# Patient Record
Sex: FEMALE | Race: WHITE | NOT HISPANIC OR LATINO | Employment: FULL TIME | URBAN - METROPOLITAN AREA
[De-identification: names, ages, dates, MRNs, and addresses within clinical notes are randomized per-mention and may not be internally consistent; named-entity substitution may affect disease eponyms.]

---

## 2017-08-08 ENCOUNTER — TRANSFERRED RECORDS (OUTPATIENT)
Dept: HEALTH INFORMATION MANAGEMENT | Facility: CLINIC | Age: 22
End: 2017-08-08

## 2017-11-03 ENCOUNTER — TELEPHONE (OUTPATIENT)
Dept: PEDIATRICS | Facility: CLINIC | Age: 22
End: 2017-11-03

## 2017-11-03 NOTE — TELEPHONE ENCOUNTER
Mother called asking if writer can assist her in finding out what is recommended for vaccinations for traveling to [facility]. Writer pulled up [facility] website and advised. Mother verbalized understanding and will make an appointment with travels clinic to also double check. Writer also spoke with mother about health safety when traveling, I.E bring copy of immunization records, medication record, international health insurance, and all needed legal forms of ID. Mother verbalized understanding.

## 2018-01-20 ENCOUNTER — HEALTH MAINTENANCE LETTER (OUTPATIENT)
Age: 23
End: 2018-01-20

## 2018-07-27 ENCOUNTER — TRANSFERRED RECORDS (OUTPATIENT)
Dept: HEALTH INFORMATION MANAGEMENT | Facility: CLINIC | Age: 23
End: 2018-07-27

## 2018-07-31 ENCOUNTER — OFFICE VISIT (OUTPATIENT)
Dept: FAMILY MEDICINE | Facility: CLINIC | Age: 23
End: 2018-07-31
Payer: COMMERCIAL

## 2018-07-31 VITALS
SYSTOLIC BLOOD PRESSURE: 112 MMHG | DIASTOLIC BLOOD PRESSURE: 72 MMHG | OXYGEN SATURATION: 98 % | BODY MASS INDEX: 33.82 KG/M2 | HEIGHT: 65 IN | WEIGHT: 203 LBS | HEART RATE: 101 BPM | TEMPERATURE: 98.3 F

## 2018-07-31 DIAGNOSIS — Z30.011 ENCOUNTER FOR INITIAL PRESCRIPTION OF CONTRACEPTIVE PILLS: Primary | ICD-10-CM

## 2018-07-31 DIAGNOSIS — B07.8 COMMON WART: ICD-10-CM

## 2018-07-31 LAB — BETA HCG QUAL IFA URINE: NEGATIVE

## 2018-07-31 PROCEDURE — 99213 OFFICE O/P EST LOW 20 MIN: CPT | Mod: 25 | Performed by: PHYSICIAN ASSISTANT

## 2018-07-31 PROCEDURE — 17110 DESTRUCTION B9 LES UP TO 14: CPT | Performed by: PHYSICIAN ASSISTANT

## 2018-07-31 PROCEDURE — 84703 CHORIONIC GONADOTROPIN ASSAY: CPT | Performed by: PHYSICIAN ASSISTANT

## 2018-07-31 RX ORDER — NORGESTIMATE AND ETHINYL ESTRADIOL 0.25-0.035
1 KIT ORAL DAILY
Qty: 28 TABLET | Refills: 1 | Status: SHIPPED | OUTPATIENT
Start: 2018-07-31 | End: 2018-09-20

## 2018-07-31 NOTE — PROGRESS NOTES
"  SUBJECTIVE:                                                    Monique Alicea is a 22 year old female who presents to clinic today for the following health issues:      WART(S)  Onset: x2 weeks - went away after having for 2 months    Description:   Location: R middle finger  Number of warts: 1  Painful: no    Accompanying Signs & Symptoms:  Signs of infection: no    History:   History of trauma: no  Prior warts: YES- middle school age    Therapies Tried and outcome: none    Patient reports that she has not been using any over the counter treatments for the wart.  She would like it frozen off today.      Patient is interested in starting the birth control pill.  She is not currently sexually active.  She reports that she is wanting it for the benefits of helping regulate her cycle.  She states that she uses tampons and that she goes through about 4-5 in the first 1-2 days of the cycle.  The cycles last about 6-7 days and they do occur monthly.      She denies migraine headaches with auras.  She also denies any smoking.      Problem list and histories reviewed & adjusted, as indicated.  Additional history: as documented      ROS:  Constitutional, HEENT, cardiovascular, pulmonary, GI, , musculoskeletal, neuro, skin, endocrine and psych systems are negative, except as otherwise noted.    OBJECTIVE:                                                    /72 (BP Location: Right arm, Patient Position: Chair, Cuff Size: Adult Large)  Pulse 101  Temp 98.3  F (36.8  C) (Oral)  Ht 5' 5.4\" (1.661 m)  Wt 203 lb (92.1 kg)  LMP 07/20/2018 (Exact Date)  SpO2 98%  Breastfeeding? No  BMI 33.37 kg/m2  Body mass index is 33.37 kg/(m^2).  GENERAL: healthy, alert and no distress  EYES: Eyes grossly normal to inspection, PERRL and conjunctivae and sclerae normal  RESP: lungs clear to auscultation - no rales, rhonchi or wheezes  BREAST: normal without masses, tenderness or nipple discharge and no palpable axillary masses or " adenopathy  CV: regular rate and rhythm, normal S1 S2, no S3 or S4, no murmur, click or rub, no peripheral edema and peripheral pulses strong  MS: no gross musculoskeletal defects noted, no edema  NEURO: Normal strength and tone, mentation intact and speech normal  PSYCH: mentation appears normal, affect normal/bright    Diagnostic Test Results:  Results for orders placed or performed in visit on 07/31/18 (from the past 24 hour(s))   Beta HCG Qual, Urine - [facility] [place] ([id number])   Result Value Ref Range    Beta HCG Qual IFA Urine Negative NEG^Negative           ASSESSMENT/PLAN:                                                      Monique was seen today for new patient and wart.    Diagnoses and all orders for this visit:    Encounter for initial prescription of contraceptive pills  -     Beta HCG Qual, Urine - [facility] [place] ([id number])  -     norgestimate-ethinyl estradiol (ORTHO-CYCLEN, SPRINTEC) 0.25-35 MG-MCG per tablet; Take 1 tablet by mouth daily    Common wart  -     DESTRUCT BENIGN LESION, UP TO 14      - Common wart on right middle finger treated today with liquid nitrogen.  Freeze/thaw cycles x3 done today.  Wart wrapped with a band-aid.    - Patient tolerated the procedure well.     - Patient will start the birth control pills.    - Patient has been advised to use back up birth control for contraception for the first 2 weeks.   - Patient was advised that she should always use condoms to prevent risk of STD's.   - Close followup advised since starting the oral contraceptive.  She has been advised to followup in about 4 weeks, or sooner if needed.    - Patient should be seen sooner if needed.     -- I have discussed the patient's diagnosis, and my plan of treatment with the patient and/or family. Patient is aware to followup if symptoms do not improve.  Patient has been advised to be seen sooner or seek more immediate care if symptoms change or worsen.  Patient agrees with and understands the plan  today.     See Patient Instructions        Irene Santoyo PA-C    [facility] [place]

## 2018-07-31 NOTE — PATIENT INSTRUCTIONS
Please followup for a medication check in about 4-5 weeks, or sooner if needed.       Birth Control: The Pill    Birth control pills contain hormones that help prevent pregnancy. The pills are prescribed by your healthcare provider. There are many types of birth control pills available. If you have side effects from one type of pill, tell your healthcare provider. He or she may be able to prescribe a pill that works better for you.  Pregnancy rates  Talk to your healthcare provider about the effectiveness of this birth control method.  Using the pill    Take one pill daily. Take it at around the same time each day.    Follow your healthcare provider s guidelines on when to start your first pack of pills. You may need to use another form of birth control for a week or more after you start.    Know what to do if you forget to take a pill. (Consult your healthcare provider or check the package.) If you miss more than one pill, you may need to use a backup method of birth control for a week or more.  Pros    Low pregnancy rate    No interruption to sex    Easy to use    Can help make periods more regular    May lower your risk of ovarian cysts and certain cancers    May decrease menstrual cramps, menstrual flow, and acne  Cons    Does not protect against sexually transmitted infection (STIs)    Requires taking a pill on time each day    May not work as well when taken with certain other medicines (check with your pharmacist)    May cause side effects such as nausea, irregular bleeding, headaches, breast tenderness, fatigue, or mood changes (these often go away within 3 months)    May increase the risk of blood clots, heart attack, and stroke  The pill may not be for you  The pill may not be for you if:    You are a smoker and over age 35    You have high blood pressure or gallbladder, liver, cerebrovascular  or heart disease    You have diabetes, migraines, blood clot in the vein or artery, lupus, depression, certain  lipid disorders, or take medicines that interfere with the pill  In these cases, discuss the risks with your healthcare provider.  Date Last Reviewed: 3/1/2017    6740-7361 [employer]. [address] [place] [address], [place] [address]. All rights reserved. This information is not intended as a substitute for professional medical care. Always follow your healthcare professional's instructions.

## 2018-07-31 NOTE — MR AVS SNAPSHOT
After Visit Summary   7/31/2018    Monique Alicea    MRN: [id number]           Patient Information     Date Of Birth          1995        Visit Information        Provider Department      7/31/2018 3:00 PM Irene Santoyo PA-C [facility] [place]        Today's Diagnoses     Encounter for initial prescription of contraceptive pills    -  1      Care Instructions    Please followup for a medication check in about 4-5 weeks, or sooner if needed.       Birth Control: The Pill    Birth control pills contain hormones that help prevent pregnancy. The pills are prescribed by your healthcare provider. There are many types of birth control pills available. If you have side effects from one type of pill, tell your healthcare provider. He or she may be able to prescribe a pill that works better for you.  Pregnancy rates  Talk to your healthcare provider about the effectiveness of this birth control method.  Using the pill    Take one pill daily. Take it at around the same time each day.    Follow your healthcare provider s guidelines on when to start your first pack of pills. You may need to use another form of birth control for a week or more after you start.    Know what to do if you forget to take a pill. (Consult your healthcare provider or check the package.) If you miss more than one pill, you may need to use a backup method of birth control for a week or more.  Pros    Low pregnancy rate    No interruption to sex    Easy to use    Can help make periods more regular    May lower your risk of ovarian cysts and certain cancers    May decrease menstrual cramps, menstrual flow, and acne  Cons    Does not protect against sexually transmitted infection (STIs)    Requires taking a pill on time each day    May not work as well when taken with certain other medicines (check with your pharmacist)    May cause side effects such as nausea, irregular bleeding, headaches, breast tenderness, fatigue, or  mood changes (these often go away within 3 months)    May increase the risk of blood clots, heart attack, and stroke  The pill may not be for you  The pill may not be for you if:    You are a smoker and over age 35    You have high blood pressure or gallbladder, liver, cerebrovascular  or heart disease    You have diabetes, migraines, blood clot in the vein or artery, lupus, depression, certain lipid disorders, or take medicines that interfere with the pill  In these cases, discuss the risks with your healthcare provider.  Date Last Reviewed: 3/1/2017    6482-4466 [employer]. [address] [place] [address]. All rights reserved. This information is not intended as a substitute for professional medical care. Always follow your healthcare professional's instructions.                Follow-ups after your visit        Follow-up notes from your care team     Return in about 4 weeks (around 8/28/2018) for medication re-check, please be seen sooner if needed.      Who to contact     If you have questions or need follow up information about today's clinic visit or your schedule please contact [facility] directly at [phone].  Normal or non-critical lab and imaging results will be communicated to you by [employer], letter or phone within 4 business days after the clinic has received the results. If you do not hear from us within 7 days, please contact the clinic through [employer] or phone. If you have a critical or abnormal lab result, we will notify you by phone as soon as possible.  Submit refill requests through [employer] or call your pharmacy and they will forward the refill request to us. Please allow 3 business days for your refill to be completed.          Additional Information About Your Visit        [employer] Information     [employer] gives you secure access to your electronic health record. If you see a primary care provider, you can also send messages to your care team and make  "appointments. If you have questions, please call your primary care clinic.  If you do not have a primary care provider, please call [phone] and they will assist you.        Care EveryWhere ID     This is your Care EveryWhere ID. This could be used by other organizations to access your [place] medical records  [id number]        Your Vitals Were     Pulse Temperature Height Last Period Pulse Oximetry Breastfeeding?    101 98.3  F (36.8  C) (Oral) 5' 5.4\" (1.661 m) 07/20/2018 (Exact Date) 98% No    BMI (Body Mass Index)                   33.37 kg/m2            Blood Pressure from Last 3 Encounters:   07/31/18 112/72   10/02/15 107/75   03/18/15 110/74    Weight from Last 3 Encounters:   07/31/18 203 lb (92.1 kg)   10/02/15 217 lb 9.6 oz (98.7 kg) (99 %)*   03/18/15 224 lb 4.8 oz (101.7 kg) (99 %)*     * Growth percentiles are based on CDC 2-20 Years data.              We Performed the Following     Beta HCG Qual, Urine - [facility] ([id number])          Today's Medication Changes          These changes are accurate as of 7/31/18  4:04 PM.  If you have any questions, ask your nurse or doctor.               Start taking these medicines.        Dose/Directions    norgestimate-ethinyl estradiol 0.25-35 MG-MCG per tablet   Commonly known as:  ORTHO-CYCLEN, SPRINTEC   Used for:  Encounter for initial prescription of contraceptive pills   Started by:  Irene Santoyo PA-C        Dose:  1 tablet   Take 1 tablet by mouth daily   Quantity:  28 tablet   Refills:  1            Where to get your medicines      These medications were sent to [place] [address] [place] [facility] [place] - [address]  [address] [place] [address]     Phone:  [phone]     norgestimate-ethinyl estradiol 0.25-35 MG-MCG per tablet                Primary Care Provider Office Phone # Fax #    Ingrid Ken [phone]       [address] [address]        Equal Access to Services  "    BONNIE PINTO : Hadii aad ku selene Perrin, waaxda luqadaha, qaybta kaalmada sonyamichellefaviola, amari jose lathamsaranmargo may. So [facility] [phone].    ATENCIÓN: Si habla español, tiene a jj disposición servicios gratuitos de asistencia lingüística. Llame al [phone].    We comply with applicable federal civil rights laws and [place] laws. We do not discriminate on the basis of race, color, national origin, age, disability, sex, sexual orientation, or gender identity.            Thank you!     Thank you for choosing [facility]  for your care. Our goal is always to provide you with excellent care. Hearing back from our patients is one way we can continue to improve our services. Please take a few minutes to complete the written survey that you may receive in the mail after your visit with us. Thank you!             Your Updated Medication List - Protect others around you: Learn how to safely use, store and throw away your medicines at www.disposemymeds.org.          This list is accurate as of 7/31/18  4:04 PM.  Always use your most recent med list.                   Brand Name Dispense Instructions for use Diagnosis    norgestimate-ethinyl estradiol 0.25-35 MG-MCG per tablet    ORTHO-CYCLEN, SPRINTEC    28 tablet    Take 1 tablet by mouth daily    Encounter for initial prescription of contraceptive pills

## 2018-09-20 DIAGNOSIS — Z30.011 ENCOUNTER FOR INITIAL PRESCRIPTION OF CONTRACEPTIVE PILLS: ICD-10-CM

## 2018-09-21 RX ORDER — NORGESTIMATE AND ETHINYL ESTRADIOL 0.25-0.035
1 KIT ORAL DAILY
Qty: 28 TABLET | Refills: 0 | Status: SHIPPED | OUTPATIENT
Start: 2018-09-21 | End: 2018-11-08

## 2018-09-21 NOTE — TELEPHONE ENCOUNTER
Medication is being filled for 1 time refill only due to:  Patient needs to be seen because due for OV per last visit notes.   Sintia Leslie RN  [facility]

## 2018-09-21 NOTE — TELEPHONE ENCOUNTER
"Requested Prescriptions   Pending Prescriptions Disp Refills     norgestimate-ethinyl estradiol (ORTHO-CYCLEN, SPRINTEC) 0.25-35 MG-MCG per tablet 28 tablet 1        Last Written Prescription Date:  7.31.18  Last Fill Quantity: 28,  # refills: 1   Last Office Visit: 7/31/2018   Future Office Visit:      Sig: Take 1 tablet by mouth daily    Contraceptives Protocol Passed    9/20/2018  9:06 AM       Passed - Patient is not a current smoker if age is 35 or older       Passed - Recent (12 mo) or future (30 days) visit within the authorizing provider's specialty    Patient had office visit in the last 12 months or has a visit in the next 30 days with authorizing provider or within the authorizing provider's specialty.  See \"Patient Info\" tab in inbasket, or \"Choose Columns\" in Meds & Orders section of the refill encounter.           Passed - No active pregnancy on record       Passed - No positive pregnancy test in past 12 months          "

## 2018-11-08 DIAGNOSIS — Z30.011 ENCOUNTER FOR INITIAL PRESCRIPTION OF CONTRACEPTIVE PILLS: ICD-10-CM

## 2018-11-08 RX ORDER — NORGESTIMATE AND ETHINYL ESTRADIOL 0.25-0.035
1 KIT ORAL DAILY
Qty: 28 TABLET | Refills: 0 | Status: SHIPPED | OUTPATIENT
Start: 2018-11-08 | End: 2018-11-19

## 2018-11-08 NOTE — TELEPHONE ENCOUNTER
Name of caller: Monique  Relationship of Patient: Self    Reason for Call: PT mother called to request a refill of her daughters norgestimate-ethinyl estradiol (ORTHO-CYCLEN, SPRINTEC) 0.25-35 MG-MCG per tablet. She is scheduled for an appt with [facility] on 11/19. She is away at college and that will be the first day she is back. They are wondering if we could refill to get her to the appt since she will be out as of this Sunday.     norgestimate-ethinyl estradiol (ORTHO-CYCLEN, SPRINTEC) 0.25-35 MG-MCG per tablet 28 tablet 0 9/21/2018  No   Sig - Route: Take 1 tablet by mouth daily - Oral   Class: E-Prescribe   Notes to Pharmacy: Pt due for OV per last OV notes. Please advise. Thank you.   Order: [id number]   E-Prescribing Status: Receipt confirmed by pharmacy (9/21/2018  3:19 PM CDT)         Best phone number to reach pt at is: [phone]  Ok to leave a message with medical info? Yes    Pharmacy preferred (if calling for a refill): [facility]  Patient Representative - [facility]

## 2018-11-08 NOTE — TELEPHONE ENCOUNTER
"Requested Prescriptions   Pending Prescriptions Disp Refills     norgestimate-ethinyl estradiol (ORTHO-CYCLEN, SPRINTEC) 0.25-35 MG-MCG per tablet 28 tablet 0      Last Written Prescription Date:  9.21.18  Last Fill Quantity: 28,  # refills: 0   Last Office Visit: 7/31/2018   Future Office Visit:    Next 5 appointments (look out 90 days)     Nov 19, 2018  4:00 PM CST   [facility] Visit with Irene Santoyo PA-C   [facility]    [address] [place] [id number] [phone]                  Sig: Take 1 tablet by mouth daily    Contraceptives Protocol Passed    11/8/2018  3:02 PM       Passed - Patient is not a current smoker if age is 35 or older       Passed - Recent (12 mo) or future (30 days) visit within the authorizing provider's specialty    Patient had office visit in the last 12 months or has a visit in the next 30 days with authorizing provider or within the authorizing provider's specialty.  See \"Patient Info\" tab in inbasket, or \"Choose Columns\" in Meds & Orders section of the refill encounter.             Passed - No active pregnancy on record       Passed - No positive pregnancy test in past 12 months          "

## 2018-11-08 NOTE — TELEPHONE ENCOUNTER
Filled per [facility] protocol.  Patient has appointment scheduled for 11/19/2018    REJI HouseN, RN  [employer] [facility]

## 2018-11-19 ENCOUNTER — OFFICE VISIT (OUTPATIENT)
Dept: FAMILY MEDICINE | Facility: CLINIC | Age: 23
End: 2018-11-19
Payer: COMMERCIAL

## 2018-11-19 VITALS
HEART RATE: 71 BPM | HEIGHT: 65 IN | TEMPERATURE: 98.2 F | OXYGEN SATURATION: 99 % | WEIGHT: 218 LBS | SYSTOLIC BLOOD PRESSURE: 116 MMHG | BODY MASS INDEX: 36.32 KG/M2 | DIASTOLIC BLOOD PRESSURE: 83 MMHG

## 2018-11-19 DIAGNOSIS — Z30.41 ENCOUNTER FOR SURVEILLANCE OF CONTRACEPTIVE PILLS: ICD-10-CM

## 2018-11-19 PROCEDURE — 99213 OFFICE O/P EST LOW 20 MIN: CPT | Performed by: PHYSICIAN ASSISTANT

## 2018-11-19 RX ORDER — NORGESTIMATE AND ETHINYL ESTRADIOL 0.25-0.035
1 KIT ORAL DAILY
Qty: 84 TABLET | Refills: 3 | Status: SHIPPED | OUTPATIENT
Start: 2018-11-19 | End: 2019-09-30

## 2018-11-19 NOTE — MR AVS SNAPSHOT
After Visit Summary   11/19/2018    Monique Alicea    MRN: [id number]           Patient Information     Date Of Birth          1995        Visit Information        Provider Department      11/19/2018 4:00 PM Irene Santoyo PA-C [facility]     Encounter for initial prescription of contraceptive pills           Follow-ups after your visit        Follow-up notes from your care team     Return in about 1 year (around 11/19/2019) for medication re-check, please be seen sooner if needed.      Who to contact     If you have questions or need follow up information about today's clinic visit or your schedule please contact [facility] directly at [phone].  Normal or non-critical lab and imaging results will be communicated to you by MyChart, letter or phone within 4 business days after the clinic has received the results. If you do not hear from us within 7 days, please contact the clinic through [email or web address] or phone. If you have a critical or abnormal lab result, we will notify you by phone as soon as possible.  Submit refill requests through [employer] or call your pharmacy and they will forward the refill request to us. Please allow 3 business days for your refill to be completed.          Additional Information About Your Visit        MyChart Information     [employer] gives you secure access to your electronic health record. If you see a primary care provider, you can also send messages to your care team and make appointments. If you have questions, please call your primary care clinic.  If you do not have a primary care provider, please call [phone] and they will assist you.        Care EveryWhere ID     This is your Care EveryWhere ID. This could be used by other organizations to access your [place] medical records  [id number]        Your Vitals Were     Pulse Temperature Height Last Period Pulse Oximetry Breastfeeding?    71 98.2  F  "(36.8  C) (Oral) 5' 5.4\" (1.661 m) 10/31/2018 (Exact Date) 99% No    BMI (Body Mass Index)                   35.83 kg/m2            Blood Pressure from Last 3 Encounters:   11/19/18 116/83   07/31/18 112/72   10/02/15 107/75    Weight from Last 3 Encounters:   11/19/18 218 lb (98.9 kg)   07/31/18 203 lb (92.1 kg)   10/02/15 217 lb 9.6 oz (98.7 kg) (99 %)*     * Growth percentiles are based on [facility] 2-20 Years data.              Today, you had the following     No orders found for display         Where to get your medicines      These medications were sent to [place] [address] [place] [address] [facility]  [address] [facility] [address]     Phone:  [phone]     norgestimate-ethinyl estradiol 0.25-35 MG-MCG per tablet          Primary Care Provider Office Phone # Fax #    Ingrid Ken [phone]       [address] [facility] [address]  [facility] [address]        Equal Access to Services     QUETA PINTO AH: Hadii kip spakrso Soterell, waaxda luqadaha, qaybta kaalmada adeegyada, amari may. So [facility] [phone].    ATENCIÓN: Si habla español, tiene a jj disposición servicios gratuitos de asistencia lingüística. Michi al [phone].    We comply with applicable federal civil rights laws and [place] laws. We do not discriminate on the basis of race, color, national origin, age, disability, sex, sexual orientation, or gender identity.            Thank you!     Thank you for choosing [facility]  for your care. Our goal is always to provide you with excellent care. Hearing back from our patients is one way we can continue to improve our services. Please take a few minutes to complete the written survey that you may receive in the mail after your visit with us. Thank you!             Your Updated Medication List - Protect others around you: Learn how to safely use, store and throw away your medicines at www.disposemymeds.org.        "   This list is accurate as of 11/19/18  4:36 PM.  Always use your most recent med list.                   Brand Name Dispense Instructions for use Diagnosis    norgestimate-ethinyl estradiol 0.25-35 MG-MCG per tablet    ORTHO-CYCLEN, SPRINTEC    84 tablet    Take 1 tablet by mouth daily    Encounter for initial prescription of contraceptive pills

## 2018-11-19 NOTE — PROGRESS NOTES
"  SUBJECTIVE:                                                    Monique Alicea is a 23 year old female who presents to clinic today for the following health issues:      Medication Followup of Birth Control    Taking Medication as prescribed: yes    Side Effects:  None    Medication Helping Symptoms:  yes      Patient reports to doing well on the birth control.  She does not have concerns today.  She thinks her cycle is better.  Patient is not sexually active.     Patient denies headaches with auras or any other potential side effects.         Problem list and histories reviewed & adjusted, as indicated.  Additional history: as documented      ROS:  Constitutional, HEENT, cardiovascular, pulmonary, GI, , musculoskeletal, neuro, skin, endocrine and psych systems are negative, except as otherwise noted.    OBJECTIVE:                                                    /83 (BP Location: Right arm, Patient Position: Chair, Cuff Size: Adult Large)  Pulse 71  Temp 98.2  F (36.8  C) (Oral)  Ht 5' 5.4\" (1.661 m)  Wt 218 lb (98.9 kg)  LMP 10/31/2018 (Exact Date)  SpO2 99%  Breastfeeding? No  BMI 35.83 kg/m2  Body mass index is 35.83 kg/(m^2).  GENERAL: healthy, alert and no distress  EYES: Eyes grossly normal to inspection, PERRL and conjunctivae and sclerae normal  RESP: lungs clear to auscultation - no rales, rhonchi or wheezes  CV: regular rate and rhythm, normal S1 S2, no S3 or S4, no murmur, click or rub, no peripheral edema and peripheral pulses strong  MS: no gross musculoskeletal defects noted, no edema  NEURO: Normal strength and tone, mentation intact and speech normal  PSYCH: mentation appears normal, affect normal/bright    Diagnostic Test Results:  none      ASSESSMENT/PLAN:                                                      Monique was seen today for recheck medication.    Diagnoses and all orders for this visit:    Encounter for surveillance of contraceptive pills  -     norgestimate-ethinyl " estradiol (ORTHO-CYCLEN, SPRINTEC) 0.25-35 MG-MCG per tablet; Take 1 tablet by mouth daily      Patient is doing well on the oral contraceptive.  She has been advised that she can continue taking this medication daily.  Patient has been reminded that she needs to followup annually for breast exams and renewal of the birth control.  Patient should be seen sooner if needed.     Followup: Return in about 1 year (around 11/19/2019) for medication re-check, please be seen sooner if needed.    -- I have discussed the patient's diagnosis, and my plan of treatment with the patient and/or family. Patient is aware to followup if symptoms do not improve.  Patient has been advised to be seen sooner or seek more immediate care if symptoms change or worsen.  Patient agrees with and understands the plan today.     See Patient Instructions        Irene Santoyo PA-C    [facility] [place]

## 2018-12-26 DIAGNOSIS — Z30.41 ENCOUNTER FOR SURVEILLANCE OF CONTRACEPTIVE PILLS: ICD-10-CM

## 2018-12-26 RX ORDER — NORGESTIMATE AND ETHINYL ESTRADIOL 0.25-0.035
1 KIT ORAL DAILY
Qty: 84 TABLET | Refills: 3 | Status: CANCELLED | OUTPATIENT
Start: 2018-12-26

## 2018-12-26 NOTE — TELEPHONE ENCOUNTER
"Requested Prescriptions   Pending Prescriptions Disp Refills     norgestimate-ethinyl estradiol (ORTHO-CYCLEN/SPRINTEC) 0.25-35 MG-MCG tablet 84 tablet 3      Last Written Prescription Date:  11.19.18  Last Fill Quantity: 84,  # refills: 3   Last Office Visit: 11/19/2018   Future Office Visit:      Sig: Take 1 tablet by mouth daily    Contraceptives Protocol Passed - 12/26/2018 12:38 PM       Passed - Patient is not a current smoker if age is 35 or older       Passed - Recent (12 mo) or future (30 days) visit within the authorizing provider's specialty    Patient had office visit in the last 12 months or has a visit in the next 30 days with authorizing provider or within the authorizing provider's specialty.  See \"Patient Info\" tab in inbasket, or \"Choose Columns\" in Meds & Orders section of the refill encounter.             Passed - No active pregnancy on record       Passed - No positive pregnancy test in past 12 months        "

## 2018-12-27 RX ORDER — ETHYNODIOL DIACETATE AND ETHINYL ESTRADIOL 1 MG-35MCG
1 KIT ORAL DAILY
Qty: 84 TABLET | Refills: 1 | Status: SHIPPED | OUTPATIENT
Start: 2018-12-27 | End: 2019-06-09

## 2018-12-27 NOTE — TELEPHONE ENCOUNTER
CTC on file for mom.     BC is causing more breakouts/acne. This is worse than before, cysts.    Huddled with JV. Advised for pt to try Demulen OCP.  The patient indicates understanding of these issues and agrees with the plan.    Sintia Leslie RN  [facility]

## 2019-06-09 DIAGNOSIS — Z30.41 ENCOUNTER FOR SURVEILLANCE OF CONTRACEPTIVE PILLS: ICD-10-CM

## 2019-06-09 RX ORDER — ETHYNODIOL DIACETATE AND ETHINYL ESTRADIOL 1 MG-35MCG
KIT ORAL
Qty: 84 TABLET | Refills: 1 | Status: SHIPPED | OUTPATIENT
Start: 2019-06-09 | End: 2019-12-09

## 2019-09-30 ENCOUNTER — OFFICE VISIT (OUTPATIENT)
Dept: INTERNAL MEDICINE | Facility: CLINIC | Age: 24
End: 2019-09-30
Payer: COMMERCIAL

## 2019-09-30 VITALS
RESPIRATION RATE: 16 BRPM | BODY MASS INDEX: 34.39 KG/M2 | OXYGEN SATURATION: 96 % | TEMPERATURE: 98.3 F | HEIGHT: 66 IN | WEIGHT: 214 LBS | SYSTOLIC BLOOD PRESSURE: 116 MMHG | HEART RATE: 94 BPM | DIASTOLIC BLOOD PRESSURE: 70 MMHG

## 2019-09-30 DIAGNOSIS — Z23 NEED FOR VACCINATION: ICD-10-CM

## 2019-09-30 DIAGNOSIS — F41.9 ANXIETY: Primary | ICD-10-CM

## 2019-09-30 DIAGNOSIS — F32.0 MILD MAJOR DEPRESSION (H): ICD-10-CM

## 2019-09-30 PROCEDURE — 99214 OFFICE O/P EST MOD 30 MIN: CPT | Mod: 25 | Performed by: INTERNAL MEDICINE

## 2019-09-30 PROCEDURE — 90471 IMMUNIZATION ADMIN: CPT | Performed by: INTERNAL MEDICINE

## 2019-09-30 PROCEDURE — 90715 TDAP VACCINE 7 YRS/> IM: CPT | Performed by: INTERNAL MEDICINE

## 2019-09-30 ASSESSMENT — PATIENT HEALTH QUESTIONNAIRE - PHQ9
SUM OF ALL RESPONSES TO PHQ QUESTIONS 1-9: 15
5. POOR APPETITE OR OVEREATING: NEARLY EVERY DAY

## 2019-09-30 ASSESSMENT — MIFFLIN-ST. JEOR: SCORE: 1743.48

## 2019-09-30 ASSESSMENT — ANXIETY QUESTIONNAIRES
7. FEELING AFRAID AS IF SOMETHING AWFUL MIGHT HAPPEN: MORE THAN HALF THE DAYS
2. NOT BEING ABLE TO STOP OR CONTROL WORRYING: MORE THAN HALF THE DAYS
5. BEING SO RESTLESS THAT IT IS HARD TO SIT STILL: NEARLY EVERY DAY
GAD7 TOTAL SCORE: 17
6. BECOMING EASILY ANNOYED OR IRRITABLE: MORE THAN HALF THE DAYS
IF YOU CHECKED OFF ANY PROBLEMS ON THIS QUESTIONNAIRE, HOW DIFFICULT HAVE THESE PROBLEMS MADE IT FOR YOU TO DO YOUR WORK, TAKE CARE OF THINGS AT HOME, OR GET ALONG WITH OTHER PEOPLE: VERY DIFFICULT
1. FEELING NERVOUS, ANXIOUS, OR ON EDGE: MORE THAN HALF THE DAYS
3. WORRYING TOO MUCH ABOUT DIFFERENT THINGS: NEARLY EVERY DAY

## 2019-09-30 NOTE — PROGRESS NOTES
Subjective     Monique Alicea is a 23 year old female who presents to clinic today for the following health issues:    HPI       Depression and Anxiety Follow-Up    How are you doing with your depression since your last visit? Worsened since 2-3 wks. , patient has been feeling more anxious, worries about a lot of things, restless, easily annoyed, sleep is disturbed, appetite varies, trouble concentrating on things,  feeling bad about self.  No suicidal ideation or thoughts.  Currently not on any medications for the symptoms.  Patient states that she has tried a few medications when she was in high school .    How are you doing with your anxiety since your last visit?  Worsened  , patient moved back home from college and started graduate schooling and also working,      Are you having other symptoms that might be associated with depression or anxiety? No    Have you had a significant life event? OTHER: moved back home from college and started graduate school     Do you have any concerns with your use of alcohol or other drugs? No    PHQ 10/2/2015 9/30/2019   PHQ-9 Total Score 2 15   Q9: Thoughts of better off dead/self-harm past 2 weeks Not at all Not at all     JORGE-7 SCORE 12/3/2014 1/26/2015 9/30/2019   Total Score 13 3 -   Total Score - - 17          Patient Active Problem List   Diagnosis     Mild major depression (H)     Anxiety     Vitamin D deficiency     Obesity, Class II, BMI 35-39.9     History reviewed. No pertinent surgical history.    Social History     Tobacco Use     Smoking status: Never Smoker     Smokeless tobacco: Never Used   Substance Use Topics     Alcohol use: Yes     Comment: 0-1 drinks per week     Family History   Problem Relation Age of Onset     Diabetes Other      Thyroid Disease Maternal Grandmother      Family History Negative Mother      Family History Negative Father          Current Outpatient Medications   Medication Sig Dispense Refill     ANITHA 1/35 1-35 MG-MCG tablet TAKE 1 TABLET  "BY MOUTH EVERY DAY 84 tablet 1     sertraline (ZOLOFT) 50 MG tablet Take 1 tablet (50 mg) by mouth daily 30 tablet 0       Reviewed and updated as needed this visit by Provider         Review of Systems   ROS COMP: CONSTITUTIONAL: NEGATIVE for fever, chills, change in weight  EYES: NEGATIVE for vision changes or irritation  RESP: NEGATIVE for significant cough or SOB  CV: NEGATIVE for chest pain, palpitations or peripheral edema  NEURO: NEGATIVE for weakness, dizziness or paresthesias  PSYCHIATRIC: anxiety and depressed mood      Objective    /70   Pulse 94   Temp 98.3  F (36.8  C) (Oral)   Resp 16   Ht 1.67 m (5' 5.75\")   Wt 97.1 kg (214 lb)   LMP 09/16/2019   SpO2 96%   BMI 34.80 kg/m    Body mass index is 34.8 kg/m .  Physical Exam   GENERAL: healthy, alert and no distress  EYES: Eyes grossly normal to inspection, PERRL and conjunctivae and sclerae normal  NECK: no adenopathy, no asymmetry, masses, or scars and thyroid normal to palpation  RESP: lungs clear to auscultation - no rales, rhonchi or wheezes  CV: regular rate and rhythm, normal S1 S2, no S3 or S4, no murmur, click or rub, no peripheral edema and peripheral pulses strong  MS: no gross musculoskeletal defects noted, no edema  NEURO: Normal strength and tone, mentation intact and speech normal  PSYCH: mentation appears normal, affect normal/bright         Assessment & Plan     (F41.9) Anxiety     (F32.0) Mild major depression (H)  Plan: started on sertraline (ZOLOFT) 50 MG tablet as directed.explained clearly about the medication,insructions and side effects.  advised to f/u in 3-4 wks.             (Z23) Need for vaccination  Plan: TDAP VACCINE (ADACEL) [15790.002], 1st          Administration  [21997]               BMI:   Estimated body mass index is 34.8 kg/m  as calculated from the following:    Height as of this encounter: 1.67 m (5' 5.75\").    Weight as of this encounter: 97.1 kg (214 lb).   Weight management plan: Discussed healthy " diet and exercise guidelines        FUTURE APPOINTMENTS:       - Follow-up visit in 3-4 wks.        Giuseppe Greco MD  [facility]

## 2019-10-01 ASSESSMENT — ANXIETY QUESTIONNAIRES: GAD7 TOTAL SCORE: 17

## 2019-10-22 DIAGNOSIS — F41.9 ANXIETY: ICD-10-CM

## 2019-10-22 DIAGNOSIS — F32.0 MILD MAJOR DEPRESSION (H): ICD-10-CM

## 2019-10-22 NOTE — LETTER
October 23, 2019      Irene Alicea  [address] [place] [address]              Dear Irene,    We care about your health and have reviewed your health plan including your medical conditions, medications, and lab results.  Based on this review, it is recommended that you follow up regarding the following health topic(s):  -Depression  -Anxiety    We recommend you take the following action(s):  -schedule a FOLLOWUP APPOINTMENT.     Please call us at [phone] (or use [employer] to address the above recommendations.     Thank you for trusting [facility] and we appreciate the opportunity to serve you.  We look forward to supporting your healthcare needs in the future.        Sincerely,    Giuseppe Greco MD/ lg

## 2019-10-22 NOTE — TELEPHONE ENCOUNTER
"Requested Prescriptions   Pending Prescriptions Disp Refills     sertraline (ZOLOFT) 50 MG tablet [Pharmacy Med Name: SERTRALINE HCL 50 MG TABLET]  Last Written Prescription Date:  9/30/2019  Last Fill Quantity: 30 tab,  # refills: 0   Last Office Visit: 9/30/2019 Mayir  Future Office Visit:      30 tablet 0     Sig: TAKE 1 TABLET BY MOUTH EVERY DAY       SSRIs Protocol Failed - 10/22/2019  8:35 AM        Failed - PHQ-9 score less than 5 in past 6 months     Please review last PHQ-9 score.      PHQ-9 SCORE 1/26/2015 10/2/2015 9/30/2019   PHQ-9 Total Score 4 - -   PHQ-9 Total Score - 2 15     JORGE-7 SCORE 12/3/2014 1/26/2015 9/30/2019   Total Score 13 3 -   Total Score - - 17          Passed - Medication is active on med list        Passed - Patient is age 18 or older        Passed - No active pregnancy on record        Passed - No positive pregnancy test in last 12 months        Passed - Recent (6 mo) or future (30 days) visit within the authorizing provider's specialty     Patient had office visit in the last 6 months or has a visit in the next 30 days with authorizing provider or within the authorizing provider's specialty.  See \"Patient Info\" tab in inbasket, or \"Choose Columns\" in Meds & Orders section of the refill encounter.            "

## 2019-10-23 NOTE — TELEPHONE ENCOUNTER
Medication is being filled for 1 time refill only due to:  Patient needs to be seen because due for 4 week follow up appointment.    Sent letter and MyChart reminder

## 2019-11-18 DIAGNOSIS — F41.9 ANXIETY: ICD-10-CM

## 2019-11-18 DIAGNOSIS — F32.0 MILD MAJOR DEPRESSION (H): ICD-10-CM

## 2019-11-18 NOTE — LETTER
[place] [address]  [facility] [address]  [phone]        November 20, 2019      Monique Alicea  [address] [place] [address]          Dear Monique Alicea    APPOINTMENT REMINDER:   Our records indicates that it is time for you to be seen for a recheck.     Your current medication request will be approved for one refill but you will need to be seen before any additional refills can be approved.    Taking care of your health is important to us, and ongoing visits with your provider are vital to your care.    We look forward to seeing you in the near future.  You may call our office at [phone] to schedule a visit.    Please disregard this notice if you have already made an appointment.      Sincerely,        [facility] Nursing Team

## 2019-11-18 NOTE — TELEPHONE ENCOUNTER
"Requested Prescriptions   Pending Prescriptions Disp Refills     sertraline (ZOLOFT) 50 MG tablet [Pharmacy Med Name: SERTRALINE HCL 50 MG TABLET] 30 tablet 0     Sig: TAKE 1 TABLET BY MOUTH EVERY DAY   Last Written Prescription Date:  10/23/2019  Last Fill Quantity: 30,  # refills: 0   Last office visit: 9/30/2019 with prescribing provider:     Future Office Visit:      SSRIs Protocol Failed - 11/18/2019 11:31 AM        Failed - PHQ-9 score less than 5 in past 6 months     Please review last PHQ-9 score.           Passed - Medication is active on med list        Passed - Patient is age 18 or older        Passed - No active pregnancy on record        Passed - No positive pregnancy test in last 12 months        Passed - Recent (6 mo) or future (30 days) visit within the authorizing provider's specialty     Patient had office visit in the last 6 months or has a visit in the next 30 days with authorizing provider or within the authorizing provider's specialty.  See \"Patient Info\" tab in inbasket, or \"Choose Columns\" in Meds & Orders section of the refill encounter.            "

## 2019-12-09 ENCOUNTER — TELEPHONE (OUTPATIENT)
Dept: FAMILY MEDICINE | Facility: CLINIC | Age: 24
End: 2019-12-09

## 2019-12-09 DIAGNOSIS — F32.0 MILD MAJOR DEPRESSION (H): ICD-10-CM

## 2019-12-09 DIAGNOSIS — F41.9 ANXIETY: ICD-10-CM

## 2019-12-09 DIAGNOSIS — Z30.41 ENCOUNTER FOR SURVEILLANCE OF CONTRACEPTIVE PILLS: ICD-10-CM

## 2019-12-09 RX ORDER — ETHYNODIOL DIACETATE AND ETHINYL ESTRADIOL 1 MG-35MCG
1 KIT ORAL DAILY
Qty: 28 TABLET | Refills: 0 | Status: SHIPPED | OUTPATIENT
Start: 2019-12-09 | End: 2020-01-09

## 2019-12-09 NOTE — TELEPHONE ENCOUNTER
Last Written Prescription Date:  6/9/2019  Last Fill Quantity: 84,  # refills: 1   Last office visit: 11/19/2018 with prescribing provider:  Yes     Future Office Visit:   Next 5 appointments (look out 90 days)    Jan 06, 2020  1:40 PM CST  [facility] [address] [facility] [address] [phone]             Prescription approved per [facility] Refill Protocol.  30 day supply given.      REJI Laws, RN, PHN  [facility]  Office: [phone]  Fax: [phone]

## 2019-12-09 NOTE — TELEPHONE ENCOUNTER
Reason for Call:  Other prescription    Detailed comments: Irene is calling to see if she can get a refill on her birth control, Kelnor. She stated she has a med check appt at another location for 1/6/2020, and that was their soonest appt. Irene is wondering if she can get a refill until that appt time to get her by. Pharmacy is [facility] in [place]. Thanks!    Phone Number Patient can be reached at: Cell number on file:    Telephone Information:       Mobile [phone]       Best Time: any    Can we leave a detailed message on this number? YES    Call taken on 12/9/2019 at 2:16 PM by Milagro Araiza

## 2019-12-15 ENCOUNTER — HEALTH MAINTENANCE LETTER (OUTPATIENT)
Age: 24
End: 2019-12-15

## 2019-12-17 DIAGNOSIS — F41.9 ANXIETY: ICD-10-CM

## 2019-12-17 DIAGNOSIS — F32.0 MILD MAJOR DEPRESSION (H): ICD-10-CM

## 2019-12-17 NOTE — TELEPHONE ENCOUNTER
"Requested Prescriptions   Pending Prescriptions Disp Refills     sertraline (ZOLOFT) 50 MG tablet [Pharmacy Med Name: SERTRALINE HCL 50 MG  Last Written Prescription Date:  11/18/2019  Last Fill Quantity: 30,  # refills: 0   Last office visit: 9/30/2019 with prescribing provider:     Future Office Visit:   Next 5 appointments (look out 90 days)    Jan 06, 2020  1:40 PM CST  [facility] [address]  [facility] [address]  [phone]        TABLET] 30 tablet 0     Sig: TAKE 1 TABLET BY MOUTH EVERY DAY       SSRIs Protocol Failed - 12/17/2019  7:36 AM        Failed - PHQ-9 score less than 5 in past 6 months     Please review last PHQ-9 score.           Passed - Medication is active on med list        Passed - Patient is age 18 or older        Passed - No active pregnancy on record        Passed - No positive pregnancy test in last 12 months        Passed - Recent (6 mo) or future (30 days) visit within the authorizing provider's specialty     Patient had office visit in the last 6 months or has a visit in the next 30 days with authorizing provider or within the authorizing provider's specialty.  See \"Patient Info\" tab in inbasket, or \"Choose Columns\" in Meds & Orders section of the refill encounter.            "

## 2019-12-18 NOTE — TELEPHONE ENCOUNTER
Routing refill request to provider for review/approval because:  PHQ-9 is out of range    PHQ-9 score:    PHQ-9 SCORE 9/30/2019   PHQ-9 Total Score -   PHQ-9 Total Score 15     Next 5 appointments (look out 90 days)    Jan 06, 2020  1:40 PM CST  [facility]  [facility] [address]  [facility] [id number] [phone]

## 2019-12-23 DIAGNOSIS — F32.0 MILD MAJOR DEPRESSION (H): ICD-10-CM

## 2019-12-23 DIAGNOSIS — F41.9 ANXIETY: ICD-10-CM

## 2019-12-23 NOTE — TELEPHONE ENCOUNTER
"INSURANCE REQUIRES A 90 DAY SUPPLY    Requested Prescriptions   Pending Prescriptions Disp Refills     sertraline (ZOLOFT) 50 MG tablet  Last Written Prescription Date:  12/18/19  Last Fill Quantity: 30,  # refills: 0   Last office visit: 9/30/2019 with prescribing provider:  09/30/19   Future Office Visit:   Next 5 appointments (look out 90 days)    Jan 06, 2020  1:40 PM CST  SHORT with Giuseppe Greco MD  [facility]) [address]  [facility] [address]  [phone]          30 tablet 0     Sig: Take 1 tablet (50 mg) by mouth daily       SSRIs Protocol Failed - 12/23/2019  2:04 PM        Failed - PHQ-9 score less than 5 in past 6 months     Please review last PHQ-9 score.           Passed - Medication is active on med list        Passed - Patient is age 18 or older        Passed - No active pregnancy on record        Passed - No positive pregnancy test in last 12 months        Passed - Recent (6 mo) or future (30 days) visit within the authorizing provider's specialty     Patient had office visit in the last 6 months or has a visit in the next 30 days with authorizing provider or within the authorizing provider's specialty.  See \"Patient Info\" tab in inbasket, or \"Choose Columns\" in Meds & Orders section of the refill encounter.              "

## 2019-12-26 NOTE — TELEPHONE ENCOUNTER
Will route to covering provider for review. Patient's insurance requires a 90 day supply.    PHQ-9 score:    PHQ-9 SCORE 9/30/2019   PHQ-9 Total Score -   PHQ-9 Total Score 15     Next 5 appointments (look out 90 days)    Jan 06, 2020  1:40 PM CST  SHORT with Giuseppe Greco MD  [facility] [address]  [facility] [id number] [phone]

## 2019-12-29 DIAGNOSIS — Z30.41 ENCOUNTER FOR SURVEILLANCE OF CONTRACEPTIVE PILLS: ICD-10-CM

## 2019-12-30 RX ORDER — ETHYNODIOL DIACETATE AND ETHINYL ESTRADIOL 1 MG-35MCG
KIT ORAL
Qty: 28 TABLET | Refills: 0 | OUTPATIENT
Start: 2019-12-30

## 2019-12-30 NOTE — TELEPHONE ENCOUNTER
"Duplicate- sent 12/9/19 and needs appt- info sent to pharmacy.  More PATIÑO RN  [facility]  [phone]      Last Written Prescription Date:  12/09/19  Last Fill Quantity: 28,  # refills: 0   Last office visit: 11/19/2018 with prescribing provider:     Future Office Visit:   Next 5 appointments (look out 90 days)    Jan 06, 2020  1:40 PM CST  SHORT with Giuseppe Greco MD  [facility] ([facility]) [address]  [facility] [id number] [phone]         Requested Prescriptions   Pending Prescriptions Disp Refills     KELNOR 1/35 1-35 MG-MCG tablet [Pharmacy Med Name: KELNOR 1-35 28 TABLET] 28 tablet 0     Sig: TAKE 1 TABLET BY MOUTH EVERY DAY       Contraceptives Protocol Failed - 12/29/2019  1:31 PM        Failed - Recent (12 mo) or future (30 days) visit within the authorizing provider's specialty     Patient has had an office visit with the authorizing provider or a provider within the authorizing providers department within the previous 12 mos or has a future within next 30 days. See \"Patient Info\" tab in inbasket, or \"Choose Columns\" in Meds & Orders section of the refill encounter.              Passed - Patient is not a current smoker if age is 35 or older        Passed - Medication is active on med list        Passed - No active pregnancy on record        Passed - No positive pregnancy test in past 12 months          "

## 2020-01-06 ENCOUNTER — OFFICE VISIT (OUTPATIENT)
Dept: INTERNAL MEDICINE | Facility: CLINIC | Age: 25
End: 2020-01-06
Payer: COMMERCIAL

## 2020-01-06 VITALS
TEMPERATURE: 97.4 F | WEIGHT: 221.6 LBS | BODY MASS INDEX: 35.62 KG/M2 | DIASTOLIC BLOOD PRESSURE: 76 MMHG | SYSTOLIC BLOOD PRESSURE: 120 MMHG | HEART RATE: 109 BPM | RESPIRATION RATE: 22 BRPM | OXYGEN SATURATION: 96 % | HEIGHT: 66 IN

## 2020-01-06 DIAGNOSIS — F41.9 ANXIETY: Primary | ICD-10-CM

## 2020-01-06 DIAGNOSIS — F32.0 MILD MAJOR DEPRESSION (H): ICD-10-CM

## 2020-01-06 PROCEDURE — 99213 OFFICE O/P EST LOW 20 MIN: CPT | Performed by: INTERNAL MEDICINE

## 2020-01-06 ASSESSMENT — MIFFLIN-ST. JEOR: SCORE: 1772.95

## 2020-01-06 ASSESSMENT — ANXIETY QUESTIONNAIRES
IF YOU CHECKED OFF ANY PROBLEMS ON THIS QUESTIONNAIRE, HOW DIFFICULT HAVE THESE PROBLEMS MADE IT FOR YOU TO DO YOUR WORK, TAKE CARE OF THINGS AT HOME, OR GET ALONG WITH OTHER PEOPLE: NOT DIFFICULT AT ALL
GAD7 TOTAL SCORE: 1
5. BEING SO RESTLESS THAT IT IS HARD TO SIT STILL: NOT AT ALL
6. BECOMING EASILY ANNOYED OR IRRITABLE: SEVERAL DAYS
7. FEELING AFRAID AS IF SOMETHING AWFUL MIGHT HAPPEN: NOT AT ALL
2. NOT BEING ABLE TO STOP OR CONTROL WORRYING: NOT AT ALL
3. WORRYING TOO MUCH ABOUT DIFFERENT THINGS: NOT AT ALL
1. FEELING NERVOUS, ANXIOUS, OR ON EDGE: NOT AT ALL

## 2020-01-06 ASSESSMENT — PATIENT HEALTH QUESTIONNAIRE - PHQ9
SUM OF ALL RESPONSES TO PHQ QUESTIONS 1-9: 4
5. POOR APPETITE OR OVEREATING: NOT AT ALL

## 2020-01-06 NOTE — PROGRESS NOTES
Subjective     Monique Alicea is a 24 year old female who presents to clinic today for the following health issues:    HPI     Depression and Anxiety Follow-Up    How are you doing with your depression since your last visit? Improved significantly , on Zoloft 50 mg daily, tolerating well.     How are you doing with your anxiety since your last visit?  Improved  significantly     Are you having other symptoms that might be associated with depression or anxiety? No    Have you had a significant life event? No     Do you have any concerns with your use of alcohol or other drugs? No      PHQ 10/2/2015 9/30/2019 1/6/2020   PHQ-9 Total Score 2 15 4   Q9: Thoughts of better off dead/self-harm past 2 weeks Not at all Not at all Not at all     JORGE-7 SCORE 1/26/2015 9/30/2019 1/6/2020   Total Score 3 - -   Total Score - 17 1          How many servings of fruits and vegetables do you eat daily?  4 or more    On average, how many sweetened beverages do you drink each day (Examples: soda, juice, sweet tea, etc.  Do NOT count diet or artificially sweetened beverages)?   2    How many days per week do you miss taking your medication? 0      Patient Active Problem List   Diagnosis     Mild major depression (H)     Anxiety     Vitamin D deficiency     Obesity, Class II, BMI 35-39.9     History reviewed. No pertinent surgical history.    Social History     Tobacco Use     Smoking status: Never Smoker     Smokeless tobacco: Never Used   Substance Use Topics     Alcohol use: Yes     Comment: 0-1 drinks per week     Family History   Problem Relation Age of Onset     Diabetes Other      Thyroid Disease Maternal Grandmother      Family History Negative Mother      Family History Negative Father          Current Outpatient Medications   Medication Sig Dispense Refill     ethynodiol-ethinyl estradiol (KELNOR 1/35) 1-35 MG-MCG tablet Take 1 tablet by mouth daily 28 tablet 0     sertraline (ZOLOFT) 50 MG tablet Take 1 tablet (50 mg) by mouth  "daily 90 tablet 0         Reviewed and updated as needed this visit by Provider         Review of Systems   ROS COMP: CONSTITUTIONAL: NEGATIVE for fever, chills, change in weight  RESP: NEGATIVE for significant cough or SOB  CV: NEGATIVE for chest pain, palpitations or peripheral edema  PSYCHIATRIC: NEGATIVE for changes in mood or affect      Objective    /76   Pulse 109   Temp 97.4  F (36.3  C) (Oral)   Resp 22   Ht 1.67 m (5' 5.75\")   Wt 100.5 kg (221 lb 9.6 oz)   LMP 01/01/2020   SpO2 96%   BMI 36.04 kg/m    Body mass index is 36.04 kg/m .  Physical Exam   GENERAL: healthy, alert and no distress  EYES: Eyes grossly normal to inspection, PERRL and conjunctivae and sclerae normal  CV: regular rate and rhythm,   RESP: lungs clear to auscultation - no rales, rhonchi or wheezes   MS: no gross musculoskeletal defects noted, no edema  NEURO: Normal strength and tone, mentation intact and speech  Psych ; mentation intact,. Mood and affect normal         Assessment & Plan     (F41.9) Anxiety  Comment: Significantly improved with a JORGE score of 1  Plan: Refilled sertraline (ZOLOFT) 50 MG tablet as directed.explained clearly about the medication,insructions and side effects.            (F32.0) Mild major depression (H)  Comment:significantly improved  Plan: sertraline (ZOLOFT) 50 MG tablet refilled.explained clearly about the medication,insructions and side effects.              Giuseppe Greco MD  [facility]        "

## 2020-01-06 NOTE — NURSING NOTE
"/76   Pulse 109   Temp 97.4  F (36.3  C) (Oral)   Resp 22   Ht 1.67 m (5' 5.75\")   Wt 100.5 kg (221 lb 9.6 oz)   LMP 01/01/2020   SpO2 96%   BMI 36.04 kg/m    Patient in for follow up on depression and anxiety.    "

## 2020-01-07 ASSESSMENT — ANXIETY QUESTIONNAIRES: GAD7 TOTAL SCORE: 1

## 2020-01-09 DIAGNOSIS — Z30.41 ENCOUNTER FOR SURVEILLANCE OF CONTRACEPTIVE PILLS: ICD-10-CM

## 2020-01-09 RX ORDER — ETHYNODIOL DIACETATE AND ETHINYL ESTRADIOL 1 MG-35MCG
1 KIT ORAL DAILY
Qty: 28 TABLET | Refills: 0 | Status: SHIPPED | OUTPATIENT
Start: 2020-01-09 | End: 2020-01-20

## 2020-01-09 NOTE — TELEPHONE ENCOUNTER
"Requested Prescriptions   Pending Prescriptions Disp Refills     ethynodiol-ethinyl estradiol (KELNOR 1/35) 1-35 MG-MCG tablet Last Written Prescription Date:  12/9/2019  Last Fill Quantity: 28 tablet,  # refills: 0   Last office visit: 1/6/2020 with prescribing provider:  1/6/2020  Future Office Visit: Wants Fannie to fill this medication from now on  Next 5 appointments (look out 90 days)    Jan 20, 2020  8:20 AM CST  PHYSICAL with Giuseppe Greco MD  [facility] [address]  [facility] [address]  [phone]        28 tablet 0     Sig: Take 1 tablet by mouth daily       Contraceptives Protocol Passed - 1/9/2020 12:20 PM        Passed - Patient is not a current smoker if age is 35 or older        Passed - Recent (12 mo) or future (30 days) visit within the authorizing provider's specialty     Patient has had an office visit with the authorizing provider or a provider within the authorizing providers department within the previous 12 mos or has a future within next 30 days. See \"Patient Info\" tab in inbasket, or \"Choose Columns\" in Meds & Orders section of the refill encounter.              Passed - Medication is active on med list        Passed - No active pregnancy on record        Passed - No positive pregnancy test in past 12 months        "

## 2020-01-09 NOTE — TELEPHONE ENCOUNTER
Medication is being filled for 1 time refill only due to:  pt is scheduled to be seen again in 2 weeks

## 2020-01-19 ASSESSMENT — ENCOUNTER SYMPTOMS
COUGH: 0
HEMATOCHEZIA: 0
DIZZINESS: 0
PALPITATIONS: 0
NERVOUS/ANXIOUS: 0
WEAKNESS: 0
BREAST MASS: 0
HEARTBURN: 0
MYALGIAS: 0
SHORTNESS OF BREATH: 0
HEADACHES: 0
FREQUENCY: 0
SORE THROAT: 0
EYE PAIN: 0
DYSURIA: 0
ABDOMINAL PAIN: 0
PARESTHESIAS: 0
HEMATURIA: 0
DIARRHEA: 0
NAUSEA: 0
CHILLS: 0
ARTHRALGIAS: 0
FEVER: 0
CONSTIPATION: 0
JOINT SWELLING: 0

## 2020-01-20 ENCOUNTER — OFFICE VISIT (OUTPATIENT)
Dept: INTERNAL MEDICINE | Facility: CLINIC | Age: 25
End: 2020-01-20
Payer: COMMERCIAL

## 2020-01-20 VITALS
RESPIRATION RATE: 11 BRPM | TEMPERATURE: 98.5 F | DIASTOLIC BLOOD PRESSURE: 64 MMHG | SYSTOLIC BLOOD PRESSURE: 120 MMHG | BODY MASS INDEX: 36.21 KG/M2 | HEIGHT: 66 IN | HEART RATE: 93 BPM | OXYGEN SATURATION: 97 % | WEIGHT: 225.3 LBS

## 2020-01-20 DIAGNOSIS — F32.0 MILD MAJOR DEPRESSION (H): ICD-10-CM

## 2020-01-20 DIAGNOSIS — F41.9 ANXIETY: ICD-10-CM

## 2020-01-20 DIAGNOSIS — Z30.8 ENCOUNTER FOR OTHER CONTRACEPTIVE MANAGEMENT: ICD-10-CM

## 2020-01-20 DIAGNOSIS — Z00.00 ROUTINE HISTORY AND PHYSICAL EXAMINATION OF ADULT: Primary | ICD-10-CM

## 2020-01-20 LAB
ALBUMIN SERPL-MCNC: 3.6 G/DL (ref 3.4–5)
ALP SERPL-CCNC: 64 U/L (ref 40–150)
ALT SERPL W P-5'-P-CCNC: 30 U/L (ref 0–50)
ANION GAP SERPL CALCULATED.3IONS-SCNC: 5 MMOL/L (ref 3–14)
AST SERPL W P-5'-P-CCNC: 19 U/L (ref 0–45)
BILIRUB SERPL-MCNC: 0.5 MG/DL (ref 0.2–1.3)
BUN SERPL-MCNC: 9 MG/DL (ref 7–30)
CALCIUM SERPL-MCNC: 9 MG/DL (ref 8.5–10.1)
CHLORIDE SERPL-SCNC: 108 MMOL/L (ref 94–109)
CHOLEST SERPL-MCNC: 198 MG/DL
CO2 SERPL-SCNC: 25 MMOL/L (ref 20–32)
CREAT SERPL-MCNC: 0.67 MG/DL (ref 0.52–1.04)
GFR SERPL CREATININE-BSD FRML MDRD: >90 ML/MIN/{1.73_M2}
GLUCOSE SERPL-MCNC: 91 MG/DL (ref 70–99)
HDLC SERPL-MCNC: 47 MG/DL
HGB BLD-MCNC: 12.9 G/DL (ref 11.7–15.7)
LDLC SERPL CALC-MCNC: 108 MG/DL
NONHDLC SERPL-MCNC: 151 MG/DL
POTASSIUM SERPL-SCNC: 3.9 MMOL/L (ref 3.4–5.3)
PROT SERPL-MCNC: 7.5 G/DL (ref 6.8–8.8)
SODIUM SERPL-SCNC: 138 MMOL/L (ref 133–144)
T4 FREE SERPL-MCNC: 0.91 NG/DL (ref 0.76–1.46)
TRIGL SERPL-MCNC: 213 MG/DL
TSH SERPL DL<=0.005 MIU/L-ACNC: 8.61 MU/L (ref 0.4–4)

## 2020-01-20 PROCEDURE — 84439 ASSAY OF FREE THYROXINE: CPT | Performed by: INTERNAL MEDICINE

## 2020-01-20 PROCEDURE — 84443 ASSAY THYROID STIM HORMONE: CPT | Performed by: INTERNAL MEDICINE

## 2020-01-20 PROCEDURE — 36415 COLL VENOUS BLD VENIPUNCTURE: CPT | Performed by: INTERNAL MEDICINE

## 2020-01-20 PROCEDURE — 99395 PREV VISIT EST AGE 18-39: CPT | Performed by: INTERNAL MEDICINE

## 2020-01-20 PROCEDURE — G0145 SCR C/V CYTO,THINLAYER,RESCR: HCPCS | Performed by: INTERNAL MEDICINE

## 2020-01-20 PROCEDURE — 85018 HEMOGLOBIN: CPT | Performed by: INTERNAL MEDICINE

## 2020-01-20 PROCEDURE — 80061 LIPID PANEL: CPT | Performed by: INTERNAL MEDICINE

## 2020-01-20 PROCEDURE — 80053 COMPREHEN METABOLIC PANEL: CPT | Performed by: INTERNAL MEDICINE

## 2020-01-20 RX ORDER — ETHYNODIOL DIACETATE AND ETHINYL ESTRADIOL 1 MG-35MCG
1 KIT ORAL DAILY
Qty: 90 TABLET | Refills: 3 | Status: SHIPPED | OUTPATIENT
Start: 2020-01-20 | End: 2020-12-22

## 2020-01-20 ASSESSMENT — ENCOUNTER SYMPTOMS
DIZZINESS: 0
CONSTIPATION: 0
ABDOMINAL PAIN: 0
COUGH: 0
DYSURIA: 0
CHILLS: 0
SHORTNESS OF BREATH: 0
MYALGIAS: 0
PALPITATIONS: 0
DIARRHEA: 0
HEADACHES: 0
NAUSEA: 0
SORE THROAT: 0
EYE PAIN: 0
BREAST MASS: 0
FREQUENCY: 0
ARTHRALGIAS: 0
WEAKNESS: 0
NERVOUS/ANXIOUS: 0
HEMATURIA: 0
PARESTHESIAS: 0
JOINT SWELLING: 0
HEMATOCHEZIA: 0
FEVER: 0
HEARTBURN: 0

## 2020-01-20 ASSESSMENT — MIFFLIN-ST. JEOR: SCORE: 1789.73

## 2020-01-20 NOTE — PROGRESS NOTES
SUBJECTIVE:   CC: Monique Alicea is an 24 year old woman who presents for preventive health visit.     Healthy Habits:     Getting at least 3 servings of Calcium per day:  Yes    Bi-annual eye exam:  Yes    Dental care twice a year:  Yes    Sleep apnea or symptoms of sleep apnea:  None    Diet:  Regular (no restrictions)    Frequency of exercise:  2-3 days/week    Duration of exercise:  15-30 minutes    Taking medications regularly:  Yes    Barriers to taking medications:  None    Medication side effects:  None    PHQ-2 Total Score: 0    Additional concerns today:  No      Depression and Anxiety Follow-Up    How are you doing with your depression since your last visit? No change    How are you doing with your anxiety since your last visit?  No change    Are you having other symptoms that might be associated with depression or anxiety? No    Have you had a significant life event? No     Do you have any concerns with your use of alcohol or other drugs? No      PHQ 10/2/2015 9/30/2019 1/6/2020   PHQ-9 Total Score 2 15 4   Q9: Thoughts of better off dead/self-harm past 2 weeks Not at all Not at all Not at all     JORGE-7 SCORE 1/26/2015 9/30/2019 1/6/2020   Total Score 3 - -   Total Score - 17 1          Today's PHQ-2 Score:   PHQ-2 ( 1999 [employer]) 1/19/2020   Q1: Little interest or pleasure in doing things 0   Q2: Feeling down, depressed or hopeless 0   PHQ-2 Score 0   Q1: Little interest or pleasure in doing things Not at all   Q2: Feeling down, depressed or hopeless Not at all   PHQ-2 Score 0       Abuse: Current or Past(Physical, Sexual or Emotional)- No  Do you feel safe in your environment? Yes      Past Medical History:   Diagnosis Date     Anxiety      Mild major depression (H)        History reviewed. No pertinent surgical history.      Current Outpatient Medications   Medication Sig Dispense Refill     ethynodiol-ethinyl estradiol (KELNOR 1/35) 1-35 MG-MCG tablet Take 1 tablet by mouth daily 90 tablet 3      sertraline (ZOLOFT) 50 MG tablet Take 1 tablet (50 mg) by mouth daily 90 tablet 3       Family History   Problem Relation Age of Onset     Diabetes Other      Thyroid Disease Maternal Grandmother      Family History Negative Mother      Family History Negative Father        Social History     Tobacco Use     Smoking status: Never Smoker     Smokeless tobacco: Never Used   Substance Use Topics     Alcohol use: Yes     Comment: 0-1 drinks per week     If you drink alcohol do you typically have >3 drinks per day or >7 drinks per week? No    Alcohol Use 1/19/2020   Prescreen: >3 drinks/day or >7 drinks/week? No   No flowsheet data found.    Reviewed orders with patient.  Reviewed health maintenance and updated orders accordingly - Yes    Mammogram not appropriate for this patient based on age.    Pertinent mammograms are reviewed under the imaging tab.  History of abnormal Pap smear: this is pts first pap - age 21-29 PAP every 3 years recommended     Reviewed and updated as needed this visit by clinical staff  Tobacco  Allergies  Meds  Med Hx  Surg Hx  Fam Hx  Soc Hx        Reviewed and updated as needed this visit by Provider          Review of Systems   Constitutional: Negative for chills and fever.   HENT: Negative for congestion, ear pain, hearing loss and sore throat.    Eyes: Negative for pain and visual disturbance.   Respiratory: Negative for cough and shortness of breath.    Cardiovascular: Negative for chest pain, palpitations and peripheral edema.   Gastrointestinal: Negative for abdominal pain, constipation, diarrhea, heartburn, hematochezia and nausea.   Breasts:  Negative for tenderness, breast mass and discharge.   Genitourinary: Negative for dysuria, frequency, genital sores, hematuria, pelvic pain, urgency, vaginal bleeding and vaginal discharge.   Musculoskeletal: Negative for arthralgias, joint swelling and myalgias.   Skin: Negative for rash.   Neurological: Negative for dizziness, weakness,  "headaches and paresthesias.   Psychiatric/Behavioral: Negative for mood changes. The patient is not nervous/anxious.         OBJECTIVE:   /64   Pulse 93   Temp 98.5  F (36.9  C) (Oral)   Resp 11   Ht 1.67 m (5' 5.75\")   Wt 102.2 kg (225 lb 4.8 oz)   LMP 01/01/2020   SpO2 97%   BMI 36.64 kg/m    Physical Exam  GENERAL: healthy, alert and no distress  EYES: Eyes grossly normal to inspection, PERRL and conjunctivae and sclerae normal  HENT: ear canals and TM's normal, nose and mouth without ulcers or lesions  NECK: no adenopathy, no asymmetry, masses, or scars and thyroid normal to palpation  RESP: lungs clear to auscultation - no rales, rhonchi or wheezes  BREAST: normal without masses, tenderness or nipple discharge and no palpable axillary masses or adenopathy  CV: regular rate and rhythm, normal S1 S2, no S3 or S4, no murmur, click or rub, no peripheral edema and peripheral pulses strong  ABDOMEN: soft, nontender, no hepatosplenomegaly, no masses and bowel sounds normal   (female):after explaining the procedure pelvic exam done,  normal female external genitalia, normal urethral meatus, vaginal mucosa pink, moist, well rugated, and normal cervix/adnexa without masses or discharge, pap obtained   MS: no gross musculoskeletal defects noted, no edema  NEURO: Normal strength and tone, mentation intact and speech normal  PSYCH: mentation appears normal, affect normal/bright      ASSESSMENT/PLAN:     (Z00.00) Routine history and physical examination of adult  (primary encounter diagnosis)  Plan: Hemoglobin, Comprehensive metabolic panel,         Lipid panel reflex to direct LDL Fasting, TSH         with free T4 reflex            (F32.0) Mild major depression (H)  Comment:stable   Plan: sertraline (ZOLOFT) 50 MG tablet daily refilled.explained clearly about the medication,insructions and side effects.             (F41.9) Anxiety  Comment: stable   Plan: sertraline (ZOLOFT) 50 MG tablet           (Z30.8) " "Encounter for other contraceptive management  Plan: ethynodiol-ethinyl estradiol (KELNOR 1/35) 1-35        MG-MCG tablet refilled.explained clearly about the medication,insructions and side effects.          COUNSELING:  Reviewed preventive health counseling, as reflected in patient instructions       Regular exercise       Healthy diet/nutrition   Declined Influenza vaccine at this time     Estimated body mass index is 36.04 kg/m  as calculated from the following:    Height as of 1/6/20: 1.67 m (5' 5.75\").    Weight as of 1/6/20: 100.5 kg (221 lb 9.6 oz).    Weight management plan: Discussed healthy diet and exercise guidelines     reports that she has never smoked. She has never used smokeless tobacco.      Counseling Resources:  ATP IV Guidelines  Pooled Cohorts Equation Calculator  Breast Cancer Risk Calculator  FRAX Risk Assessment  ICSI Preventive Guidelines  Dietary Guidelines for Americans, 2010  USDA's MyPlate  ASA Prophylaxis  Lung CA Screening    Giuseppe Greco MD  [facility]  "

## 2020-01-23 LAB
COPATH REPORT: NORMAL
PAP: NORMAL

## 2020-06-01 ENCOUNTER — VIRTUAL VISIT (OUTPATIENT)
Dept: INTERNAL MEDICINE | Facility: CLINIC | Age: 25
End: 2020-06-01
Payer: COMMERCIAL

## 2020-06-01 DIAGNOSIS — F32.0 MILD MAJOR DEPRESSION (H): ICD-10-CM

## 2020-06-01 DIAGNOSIS — R42 VERTIGO: Primary | ICD-10-CM

## 2020-06-01 DIAGNOSIS — R79.89 ELEVATED TSH: ICD-10-CM

## 2020-06-01 PROCEDURE — 99214 OFFICE O/P EST MOD 30 MIN: CPT | Mod: TEL | Performed by: INTERNAL MEDICINE

## 2020-06-01 RX ORDER — MECLIZINE HCL 12.5 MG 12.5 MG/1
12.5 TABLET ORAL 3 TIMES DAILY PRN
Qty: 30 TABLET | Refills: 0 | Status: SHIPPED | OUTPATIENT
Start: 2020-06-01 | End: 2022-02-02

## 2020-06-01 ASSESSMENT — PATIENT HEALTH QUESTIONNAIRE - PHQ9: SUM OF ALL RESPONSES TO PHQ QUESTIONS 1-9: 4

## 2020-06-01 NOTE — PROGRESS NOTES
"Monique Alicea is a 24 year old female who is being evaluated via a billable video  visit.      The patient has been notified of following:     \"This video visit will be conducted via a call between you and your physician/provider. We have found that certain health care needs can be provided without the need for an in-person physical exam.  This service lets us provide the care you need with a video conversation.  If a prescription is necessary we can send it directly to your pharmacy.  If lab work is needed we can place an order for that and you can then stop by our lab to have the test done at a later time.    Video visits are billed at different rates depending on your insurance coverage.  Please reach out to your insurance provider with any questions.    If during the course of the call the physician/provider feels a video visit is not appropriate, you will not be charged for this service.\"    Patient has given verbal consent for Video visit? Yes    How would you like to obtain your AVS? [place]    Patient would like the video invitation sent by: Text to cell phone: [phone]    Will anyone else be joining your video visit? No    Video start time - 2:56 pm      Subjective     Monique Alicea is a 24 year old female who presents via phone visit today for the following health issues:    HPI     Abnormal Thyroid test follow up; pt had TSH in 01/20 and was 8.61,pt was advised to rpt the test but pt did not follow up. Pt c/o wt gain 15 lbs in the last 5 months, has fatigue. Dry skin.  MGM has h/o thyroid dz.      Depression Followup    How are you doing with your depression since your last visit? No change , on zoloft 50 mg daily     Are you having other symptoms that might be associated with depression? No    Have you had a significant life event?  No     Are you feeling anxious or having panic attacks?   No    Do you have any concerns with your use of alcohol or other drugs? No      PHQ 9/30/2019 1/6/2020 6/1/2020 "   PHQ-9 Total Score 15 4 4   Q9: Thoughts of better off dead/self-harm past 2 weeks Not at all Not at all Not at all           Pt also c/o Dizziness which started about 1 wk ago ,dizziness is more with head movement, looking up, turning in bed, also c/o  Nausea .    Pt wears contacts and making appt with ophthalmologist for vision issues.     Video start time - 2:56 pm     Patient Active Problem List   Diagnosis     Mild major depression (H)     Anxiety     Vitamin D deficiency     Obesity, Class II, BMI 35-39.9     History reviewed. No pertinent surgical history.    Social History     Tobacco Use     Smoking status: Never Smoker     Smokeless tobacco: Never Used   Substance Use Topics     Alcohol use: Yes     Comment: 0-1 drinks per week     Family History   Problem Relation Age of Onset     Diabetes Other      Thyroid Disease Maternal Grandmother      Family History Negative Mother      Family History Negative Father          Current Outpatient Medications   Medication Sig Dispense Refill     ethynodiol-ethinyl estradiol (KELNOR 1/35) 1-35 MG-MCG tablet Take 1 tablet by mouth daily 90 tablet 3     sertraline (ZOLOFT) 50 MG tablet Take 1 tablet (50 mg) by mouth daily 90 tablet 3       Reviewed and updated as needed this visit by Provider         Review of Systems   CONSTITUTIONAL: NEGATIVE for fever, chills, change in weight  ENT/MOUTH: NEGATIVE for ear, mouth and throat problems  RESP: NEGATIVE for significant cough or SOB  CV: NEGATIVE for chest pain, palpitations or peripheral edema  GI: NEGATIVE for nausea, abdominal pain, heartburn, or change in bowel habits  MUSCULOSKELETAL: NEGATIVE for significant arthralgias or myalgia  NEURO:  Dizziness   ENDOCRINE: has wt gain NEGATIVE for temperature intolerance,    PSYCHIATRIC: NEGATIVE for changes in mood or affect       Objective   Reported vitals:  There were no vitals taken for this visit.   healthy, alert and no distress  PSYCH: Alert and oriented times 3;  coherent speech, normal   rate and volume, able to articulate logical thoughts, able   to abstract reason, no tangential thoughts, no hallucinations   or delusions  Her affect is normal  RESP: No cough, no audible wheezing, able to talk in full sentences  Remainder of exam unable to be completed due to telephone visits             Assessment/Plan:    1. Vertigo  - explained about the condition , started on meclizine (ANTIVERT) 12.5 MG tablet; Take 1 tablet (12.5 mg) by mouth 3 times daily as needed for dizziness   .explained clearly about the medication,insructions and side effects.Advised not to drive or operate any machinery while on this med      2. Elevated TSH  - TSH with free T4 reflex; Future.pt was told I will contact her after results and proceed accordingly.  Will start levoxyl if TSH still high        3. Mild major depression (H)  - on zoloft 50 mg daily          Video-Visit Details    Type of service:  Video Visit    Video End Time:  3:10  PM    Originating Location (pt. Location): Home    Distant Location (provider location):  [facility]     Platform used for Video Visit: Neha Greco MD

## 2020-06-09 DIAGNOSIS — R79.89 ELEVATED TSH: ICD-10-CM

## 2020-06-09 PROCEDURE — 36415 COLL VENOUS BLD VENIPUNCTURE: CPT | Performed by: INTERNAL MEDICINE

## 2020-06-09 PROCEDURE — 84443 ASSAY THYROID STIM HORMONE: CPT | Performed by: INTERNAL MEDICINE

## 2020-06-09 PROCEDURE — 84439 ASSAY OF FREE THYROXINE: CPT | Performed by: INTERNAL MEDICINE

## 2020-06-10 LAB
T4 FREE SERPL-MCNC: 1.02 NG/DL (ref 0.76–1.46)
TSH SERPL DL<=0.005 MIU/L-ACNC: 6.54 MU/L (ref 0.4–4)

## 2020-06-24 ENCOUNTER — MYC MEDICAL ADVICE (OUTPATIENT)
Dept: INTERNAL MEDICINE | Facility: CLINIC | Age: 25
End: 2020-06-24

## 2020-06-30 ENCOUNTER — MYC REFILL (OUTPATIENT)
Dept: INTERNAL MEDICINE | Facility: CLINIC | Age: 25
End: 2020-06-30

## 2020-06-30 DIAGNOSIS — F41.9 ANXIETY: ICD-10-CM

## 2020-06-30 DIAGNOSIS — F32.0 MILD MAJOR DEPRESSION (H): ICD-10-CM

## 2020-06-30 DIAGNOSIS — Z30.8 ENCOUNTER FOR OTHER CONTRACEPTIVE MANAGEMENT: ICD-10-CM

## 2020-07-01 RX ORDER — ETHYNODIOL DIACETATE AND ETHINYL ESTRADIOL 1 MG-35MCG
1 KIT ORAL DAILY
Qty: 90 TABLET | Refills: 3 | OUTPATIENT
Start: 2020-07-01

## 2020-10-21 ENCOUNTER — TELEPHONE (OUTPATIENT)
Dept: INTERNAL MEDICINE | Facility: CLINIC | Age: 25
End: 2020-10-21

## 2020-10-21 DIAGNOSIS — R42 DIZZINESS: Primary | ICD-10-CM

## 2020-10-21 NOTE — TELEPHONE ENCOUNTER
Called patient at request of TC as there were concerns about some of her symptoms.  Patient was seen in June 2020 for dizziness.  She has scheduled a video visit this Fri. 10/23 to follow up.  She feels the dizziness has become more frequent, daily.  Also wakes up feeling very nauseous for about 1 hour whether she has napped or slept the entire night.  During episodes of dizziness and nausea she does not always respond to others verbally according to her mother.  She feels this is because she is so wrapped up in feeling ill and kind of tunes others out.  States she feels like she is not fully in her body at times, a little disoriented, sense of space seems distorted almost like she is not in her own body.  Has at times felt like she may faint but has not had any syncopal episodes or falls.  Making sure to stay hydrated and eats regularly throughout the day, feels she is getting plenty of sleep but still feels tired.   Will keep appt for video visit 10/23 and if symptoms worsen before then she will go to ER.  ROEL Causey R.N.

## 2020-10-21 NOTE — TELEPHONE ENCOUNTER
Patient needs to see neurologist, I have ordered a neurology referral please advise patient to call t   [facility] [phone]    make an appointment with a neurologist and ER if symptoms worsen

## 2020-10-23 ENCOUNTER — TRANSFERRED RECORDS (OUTPATIENT)
Dept: HEALTH INFORMATION MANAGEMENT | Facility: CLINIC | Age: 25
End: 2020-10-23

## 2020-10-30 ENCOUNTER — TRANSFERRED RECORDS (OUTPATIENT)
Dept: HEALTH INFORMATION MANAGEMENT | Facility: CLINIC | Age: 25
End: 2020-10-30

## 2020-12-21 DIAGNOSIS — F41.9 ANXIETY: ICD-10-CM

## 2020-12-21 DIAGNOSIS — F32.0 MILD MAJOR DEPRESSION (H): ICD-10-CM

## 2020-12-23 ENCOUNTER — MYC MEDICAL ADVICE (OUTPATIENT)
Dept: INTERNAL MEDICINE | Facility: CLINIC | Age: 25
End: 2020-12-23

## 2020-12-23 NOTE — TELEPHONE ENCOUNTER
"Requested Prescriptions   Pending Prescriptions Disp Refills     sertraline (ZOLOFT) 50 MG tablet [Pharmacy Med Name: SERTRALINE HCL 50 MG TABLET] 90 tablet 3     Sig: TAKE 1 TABLET BY MOUTH EVERY DAY       SSRIs Protocol Failed - 12/21/2020 11:15 AM        Failed - PHQ-9 score less than 5 in past 6 months     Please review last PHQ-9 score.           Failed - Recent (6 mo) or future (30 days) visit within the authorizing provider's specialty     Patient had office visit in the last 6 months or has a visit in the next 30 days with authorizing provider or within the authorizing provider's specialty.  See \"Patient Info\" tab in inbasket, or \"Choose Columns\" in Meds & Orders section of the refill encounter.            Passed - Medication is active on med list        Passed - Patient is age 18 or older        Passed - No active pregnancy on record        Passed - No positive pregnancy test in last 12 months             PHQ-9 score:    PHQ 6/1/2020   PHQ-9 Total Score 4   Q9: Thoughts of better off dead/self-harm past 2 weeks Not at all     Last virtual visit 6-1-20    Routing refill request to provider for review/approval because:  Protocol failed.    Please advise, thanks.  Routed to covering provider.          "

## 2020-12-23 NOTE — TELEPHONE ENCOUNTER
Medication is being filled for 1 time refill only due to:  Patient needs to be seen because need appt and PHQ    Routing to MA/TC pool. The Pt is due for a visit with PCP      Edgar Enrique RN, BSN

## 2021-01-15 ENCOUNTER — HEALTH MAINTENANCE LETTER (OUTPATIENT)
Age: 26
End: 2021-01-15

## 2021-02-01 ENCOUNTER — TRANSFERRED RECORDS (OUTPATIENT)
Dept: HEALTH INFORMATION MANAGEMENT | Facility: CLINIC | Age: 26
End: 2021-02-01

## 2021-03-03 ENCOUNTER — TRANSFERRED RECORDS (OUTPATIENT)
Dept: HEALTH INFORMATION MANAGEMENT | Facility: CLINIC | Age: 26
End: 2021-03-03

## 2021-03-16 DIAGNOSIS — F32.0 MILD MAJOR DEPRESSION (H): ICD-10-CM

## 2021-03-16 DIAGNOSIS — F41.9 ANXIETY: ICD-10-CM

## 2021-03-18 NOTE — TELEPHONE ENCOUNTER
Patient is scheduled for follow-up visit with Dr. Greco on April 12  Dr. Greco will decide about 90-day prescription at that time

## 2021-03-18 NOTE — TELEPHONE ENCOUNTER
Pending Prescriptions:                       Disp   Refills    sertraline (ZOLOFT) 50 MG tablet [Pharmacy*90 tab*         Sig: TAKE 1 TABLET BY MOUTH EVERY DAY    Patient is requesting a 90 day supply, please advise

## 2021-03-20 ENCOUNTER — HEALTH MAINTENANCE LETTER (OUTPATIENT)
Age: 26
End: 2021-03-20

## 2021-03-30 ENCOUNTER — APPOINTMENT (OUTPATIENT)
Dept: URGENT CARE | Facility: CLINIC | Age: 26
End: 2021-03-30
Payer: COMMERCIAL

## 2021-04-12 ENCOUNTER — VIRTUAL VISIT (OUTPATIENT)
Dept: INTERNAL MEDICINE | Facility: CLINIC | Age: 26
End: 2021-04-12
Payer: COMMERCIAL

## 2021-04-12 DIAGNOSIS — F41.9 ANXIETY: ICD-10-CM

## 2021-04-12 DIAGNOSIS — F32.0 MILD MAJOR DEPRESSION (H): ICD-10-CM

## 2021-04-12 DIAGNOSIS — Z30.8 ENCOUNTER FOR OTHER CONTRACEPTIVE MANAGEMENT: ICD-10-CM

## 2021-04-12 PROCEDURE — 99213 OFFICE O/P EST LOW 20 MIN: CPT | Mod: 95 | Performed by: INTERNAL MEDICINE

## 2021-04-12 RX ORDER — ETHYNODIOL DIACETATE AND ETHINYL ESTRADIOL 1 MG-35MCG
1 KIT ORAL DAILY
Qty: 84 TABLET | Refills: 0 | Status: SHIPPED | OUTPATIENT
Start: 2021-04-12 | End: 2021-05-11

## 2021-04-12 ASSESSMENT — ANXIETY QUESTIONNAIRES
6. BECOMING EASILY ANNOYED OR IRRITABLE: NOT AT ALL
7. FEELING AFRAID AS IF SOMETHING AWFUL MIGHT HAPPEN: NOT AT ALL
2. NOT BEING ABLE TO STOP OR CONTROL WORRYING: NOT AT ALL
3. WORRYING TOO MUCH ABOUT DIFFERENT THINGS: SEVERAL DAYS
5. BEING SO RESTLESS THAT IT IS HARD TO SIT STILL: SEVERAL DAYS
IF YOU CHECKED OFF ANY PROBLEMS ON THIS QUESTIONNAIRE, HOW DIFFICULT HAVE THESE PROBLEMS MADE IT FOR YOU TO DO YOUR WORK, TAKE CARE OF THINGS AT HOME, OR GET ALONG WITH OTHER PEOPLE: NOT DIFFICULT AT ALL
1. FEELING NERVOUS, ANXIOUS, OR ON EDGE: NOT AT ALL
GAD7 TOTAL SCORE: 2

## 2021-04-12 ASSESSMENT — PATIENT HEALTH QUESTIONNAIRE - PHQ9
5. POOR APPETITE OR OVEREATING: NOT AT ALL
SUM OF ALL RESPONSES TO PHQ QUESTIONS 1-9: 3

## 2021-04-12 NOTE — PROGRESS NOTES
Irene is a 25 year old who is being evaluated via a billable video visit.      How would you like to obtain your AVS? MyChart  If the video visit is dropped, the invitation should be resent by: Text to cell phone: [phone]  Will anyone else be joining your video visit? No      Video Start Time: 11:58 AM    Assessment & Plan           (F32.0) Mild major depression (H)  (F41.9) Anxiety  Plan: stable , refilled sertraline (ZOLOFT) 50 MG tablet.explained clearly about the medication,insructions and side effects.            (Z30.8) Encounter for other contraceptive management  Plan: ethynodiol-ethinyl estradiol (KELNOR 1/35) 1-35        MG-MCG tablet refilled.explained clearly about the medication,insructions and side effects.   pt is due for physical , she will make appt in 1-2 months.              Return in about 4 weeks (around 5/10/2021).    Giuseppe Greco MD  [facility]   Irene is a 25 year old who presents for the following health issues     HPI     Depression and Anxiety Follow-Up    How are you doing with your depression since your last visit? No change    How are you doing with your anxiety since your last visit?  No change    Are you having other symptoms that might be associated with depression or anxiety? No    Have you had a significant life event? No     Do you have any concerns with your use of alcohol or other drugs? No      PHQ 1/6/2020 6/1/2020 4/12/2021   PHQ-9 Total Score 4 4 3   Q9: Thoughts of better off dead/self-harm past 2 weeks Not at all Not at all Not at all     JORGE-7 SCORE 9/30/2019 1/6/2020 4/12/2021   Total Score - - -   Total Score 17 1 2      [id number]}    How many servings of fruits and vegetables do you eat daily?  2-3    On average, how many sweetened beverages do you drink each day (Examples: soda, juice, sweet tea, etc.  Do NOT count diet or artificially sweetened beverages)?   2    How many days per week do you exercise enough to  make your heart beat faster? 4    How many minutes a day do you exercise enough to make your heart beat faster? 30 - 60    How many days per week do you miss taking your medication? 0      Past Medical History:   Diagnosis Date     Anxiety      Mild major depression (H)        Current Outpatient Medications   Medication Sig Dispense Refill     ethynodiol-ethinyl estradiol (KELNOR 1/35) 1-35 MG-MCG tablet Take 1 tablet by mouth daily 84 tablet 0     meclizine (ANTIVERT) 12.5 MG tablet Take 1 tablet (12.5 mg) by mouth 3 times daily as needed for dizziness 30 tablet 0     sertraline (ZOLOFT) 50 MG tablet Take 1 tablet (50 mg) by mouth daily 90 tablet 1         Review of Systems   CONSTITUTIONAL: NEGATIVE for fever, chills, change in weight  RESP: NEGATIVE for significant cough or SOB  CV: NEGATIVE for chest pain, palpitations or peripheral edema  PSYCHIATRIC: NEGATIVE for changes in mood or affect      Objective           Vitals:  No vitals were obtained today due to virtual visit.    Physical Exam   GENERAL: Healthy, alert and no distress  EYES: Eyes grossly normal to inspection.  No discharge or erythema, or obvious scleral/conjunctival abnormalities.  RESP: No audible wheeze, cough, or visible cyanosis.  No visible retractions or increased work of breathing.    PSYCH: Mentation appears normal, affect normal/bright, judgement and insight intact, normal speech and appearance well-groomed.           Video-Visit Details    Type of service:  Video Visit    Video End Time:12:05 PM    Originating Location (pt. Location): Other work     Distant Location (provider location):  [facility]     Platform [facility] for Video Visit: [facility]

## 2021-04-13 ASSESSMENT — ANXIETY QUESTIONNAIRES: GAD7 TOTAL SCORE: 2

## 2021-05-11 ENCOUNTER — VIRTUAL VISIT (OUTPATIENT)
Dept: INTERNAL MEDICINE | Facility: CLINIC | Age: 26
End: 2021-05-11
Payer: COMMERCIAL

## 2021-05-11 DIAGNOSIS — K12.0 APHTHOUS ULCER OF MOUTH: Primary | ICD-10-CM

## 2021-05-11 DIAGNOSIS — Z30.8 ENCOUNTER FOR OTHER CONTRACEPTIVE MANAGEMENT: ICD-10-CM

## 2021-05-11 PROCEDURE — 99214 OFFICE O/P EST MOD 30 MIN: CPT | Mod: 95 | Performed by: INTERNAL MEDICINE

## 2021-05-11 RX ORDER — TRIAMCINOLONE ACETONIDE 0.1 %
PASTE (GRAM) DENTAL 2 TIMES DAILY
Qty: 5 G | Refills: 0 | Status: SHIPPED | OUTPATIENT
Start: 2021-05-11 | End: 2022-02-02

## 2021-05-11 RX ORDER — TRIAMCINOLONE ACETONIDE 0.1 %
PASTE (GRAM) DENTAL 2 TIMES DAILY
Qty: 5 G | Refills: 0 | Status: SHIPPED | OUTPATIENT
Start: 2021-05-11 | End: 2021-05-11

## 2021-05-11 RX ORDER — ETHYNODIOL DIACETATE AND ETHINYL ESTRADIOL 1 MG-35MCG
1 KIT ORAL DAILY
Qty: 84 TABLET | Refills: 0 | Status: SHIPPED | OUTPATIENT
Start: 2021-05-11 | End: 2021-11-23

## 2021-05-11 NOTE — PROGRESS NOTES
Irene is a 25 year old who is being evaluated via a billable video visit.      How would you like to obtain your AVS? MyChart  If the video visit is dropped, the invitation should be resent by: Text to cell phone: [phone]  Will anyone else be joining your video visit? No      Video Start Time: 4:02 PM    Assessment & Plan     (K12.0) Aphthous ulcer of mouth  (primary encounter diagnosis)  Comment:explained about the condition  Plan: triamcinolone (KENALOG) 0.1 % paste, advised to apply bid to mouth ulcers.Call or return to clinic prn if these symtoms worsen, fail to improve as anticipated, or if new symptoms develop.           (Z30.8) Encounter for other contraceptive management  Plan: ethynodiol-ethinyl estradiol (KELNOR 1/35) 1-35        MG-MCG tablet refilled.explained clearly about the medication,insructions and side effects.            Prescription drug management       No follow-ups on file.    Giuseppe Greco MD  [facility]   Irene is a 25 year old who presents for the following health issues     HPI      Pt is a 25 year old female who is seen here to day with c/o having canker sores in her mouth on and off since 3 weeks, and this episode started to 3- 4 days ago and has been painful, has noticed sores under the tongue and also cheek area, patient has been doing saltwater gargling without much symptom relief.  Patient states she is under some stress lately.    Patient is also requesting refills on birth control pills      Past Medical History:   Diagnosis Date     Anxiety      Mild major depression (H)        Current Outpatient Medications   Medication Sig Dispense Refill     ethynodiol-ethinyl estradiol (KELNOR 1/35) 1-35 MG-MCG tablet Take 1 tablet by mouth daily 84 tablet 0     meclizine (ANTIVERT) 12.5 MG tablet Take 1 tablet (12.5 mg) by mouth 3 times daily as needed for dizziness 30 tablet 0     sertraline (ZOLOFT) 50 MG tablet Take 1 tablet (50 mg)  by mouth daily 90 tablet 1     triamcinolone (KENALOG) 0.1 % paste Take by mouth 2 times daily Apply to canker sores 5 g 0       Review of Systems   CONSTITUTIONAL: NEGATIVE for fever, chills, change in weight  ENT/MOUTH: NEGATIVE for ear, mouth and throat problems      Objective           Vitals:  No vitals were obtained today due to virtual visit.    Physical Exam   GENERAL: Healthy, alert and no distress  EYES: Eyes grossly normal to inspection.  No discharge or erythema, or obvious scleral/conjunctival abnormalities.  HENT: one aphthous ulcer noted under lateral tongue( Limited exam due to video visit)   RESP: No audible wheeze, cough, or visible cyanosis.  No visible retractions or increased work of breathing.             Video-Visit Details    Type of service:  Video Visit    Video End Time:4:11 PM    Originating Location (pt. Location): Home    Distant Location (provider location):  [facility]     Platform used for Video Visit: [facility]

## 2021-09-04 ENCOUNTER — HEALTH MAINTENANCE LETTER (OUTPATIENT)
Age: 26
End: 2021-09-04

## 2021-11-21 DIAGNOSIS — Z30.8 ENCOUNTER FOR OTHER CONTRACEPTIVE MANAGEMENT: ICD-10-CM

## 2021-11-23 RX ORDER — ETHYNODIOL DIACETATE AND ETHINYL ESTRADIOL 1 MG-35MCG
KIT ORAL
Qty: 84 TABLET | Refills: 0 | Status: SHIPPED | OUTPATIENT
Start: 2021-11-23 | End: 2022-02-10

## 2021-11-23 NOTE — TELEPHONE ENCOUNTER
Routing refill request to provider for review/approval because:  Patient needs to be seen because:  passed protocol, but looks like she was to follow up and is over due for appointment    Pending Prescriptions:                       Disp   Refills    KELNOR 1/35 1-35 MG-MCG tablet [Pharmacy M*84 tab*0        Sig: TAKE 1 TABLET BY MOUTH EVERY DAY

## 2021-11-24 NOTE — TELEPHONE ENCOUNTER
Last clinic visit 1/20/2020,, patient has had virtual visits for depression, please advise office visit for physical, 1 refill done

## 2022-02-02 ENCOUNTER — TELEPHONE (OUTPATIENT)
Dept: INTERNAL MEDICINE | Facility: CLINIC | Age: 27
End: 2022-02-02

## 2022-02-02 ENCOUNTER — OFFICE VISIT (OUTPATIENT)
Dept: INTERNAL MEDICINE | Facility: CLINIC | Age: 27
End: 2022-02-02
Payer: COMMERCIAL

## 2022-02-02 VITALS
SYSTOLIC BLOOD PRESSURE: 124 MMHG | HEIGHT: 66 IN | OXYGEN SATURATION: 98 % | BODY MASS INDEX: 37.75 KG/M2 | HEART RATE: 68 BPM | WEIGHT: 234.9 LBS | TEMPERATURE: 97.9 F | DIASTOLIC BLOOD PRESSURE: 81 MMHG | RESPIRATION RATE: 16 BRPM

## 2022-02-02 DIAGNOSIS — E66.812 OBESITY, CLASS II, BMI 35-39.9: ICD-10-CM

## 2022-02-02 DIAGNOSIS — F41.9 ANXIETY: ICD-10-CM

## 2022-02-02 DIAGNOSIS — R79.89 ABNORMAL TSH: Primary | ICD-10-CM

## 2022-02-02 DIAGNOSIS — F32.0 MILD MAJOR DEPRESSION (H): ICD-10-CM

## 2022-02-02 DIAGNOSIS — E03.9 HYPOTHYROIDISM, UNSPECIFIED TYPE: ICD-10-CM

## 2022-02-02 PROCEDURE — 99214 OFFICE O/P EST MOD 30 MIN: CPT | Performed by: INTERNAL MEDICINE

## 2022-02-02 PROCEDURE — 36415 COLL VENOUS BLD VENIPUNCTURE: CPT | Performed by: INTERNAL MEDICINE

## 2022-02-02 PROCEDURE — 84439 ASSAY OF FREE THYROXINE: CPT | Performed by: INTERNAL MEDICINE

## 2022-02-02 PROCEDURE — 84443 ASSAY THYROID STIM HORMONE: CPT | Performed by: INTERNAL MEDICINE

## 2022-02-02 RX ORDER — GABAPENTIN 100 MG/1
600 CAPSULE ORAL 2 TIMES DAILY
COMMUNITY
Start: 2022-02-02 | End: 2022-09-27

## 2022-02-02 ASSESSMENT — ANXIETY QUESTIONNAIRES
GAD7 TOTAL SCORE: 4
5. BEING SO RESTLESS THAT IT IS HARD TO SIT STILL: NOT AT ALL
3. WORRYING TOO MUCH ABOUT DIFFERENT THINGS: SEVERAL DAYS
7. FEELING AFRAID AS IF SOMETHING AWFUL MIGHT HAPPEN: NOT AT ALL
6. BECOMING EASILY ANNOYED OR IRRITABLE: NOT AT ALL
2. NOT BEING ABLE TO STOP OR CONTROL WORRYING: SEVERAL DAYS
1. FEELING NERVOUS, ANXIOUS, OR ON EDGE: SEVERAL DAYS
IF YOU CHECKED OFF ANY PROBLEMS ON THIS QUESTIONNAIRE, HOW DIFFICULT HAVE THESE PROBLEMS MADE IT FOR YOU TO DO YOUR WORK, TAKE CARE OF THINGS AT HOME, OR GET ALONG WITH OTHER PEOPLE: NOT DIFFICULT AT ALL

## 2022-02-02 ASSESSMENT — PATIENT HEALTH QUESTIONNAIRE - PHQ9
SUM OF ALL RESPONSES TO PHQ QUESTIONS 1-9: 2
5. POOR APPETITE OR OVEREATING: SEVERAL DAYS

## 2022-02-02 ASSESSMENT — MIFFLIN-ST. JEOR: SCORE: 1823.28

## 2022-02-02 NOTE — TELEPHONE ENCOUNTER
Lab alarm went off. This RN was notified and responded immediately. Patient was found to be alert and oriented x4. She is calm and cooperative. Patient's face was pale and she had a litte sweat on her forehead. This RN was informed by lab staff that after her labs were drawn, patient passed out for unknown amount of time (lab staff thinks maybe 1-2 minutes). Patient did not fall per lab staff.     Patient denies any nausea, dizziness, or pain. Patient reports she has not had much to eat today and that this has happened once before when she had labs drawn on a day when she hadn't eaten much.     Patient was given a snack and drink while sitting in the chair. Her coloring in her face improved. Chair then reclined after patient ate.   Vitals checked:   114/85, 114/82 left arm (manual), pulse 85, O2 sat 100% on room air    After 15 minutes, vitals re-checked and the same as above. Patient reports feeling well. Patient demonstrated ambulating in the lab without any dizziness. Steady gait.     Patient reports she feels comfortable leaving the clinic at this time. This RN instructed patient to take it slowly today and to inform clinic or seek medical attention if she had any return of symptoms. Patient will also eat a meal and inform lab staff at future lab drawn that this has happened in the past. Patient verbalized understanding and denies any questions or concerns.       Routing to provider, Dr. Greco, lorie APARICIO.       Vania PATIÑO RN   [facility]

## 2022-02-02 NOTE — PROGRESS NOTES
"  Assessment & Plan     (R79.89) Abnormal TSH    Plan: check TSH with free T4 reflex.pt was told I will contact her after results and proceed accordingly.- TSH 7.34 , increased since last time. T4 normal       (E03.9) Hypothyroidism, unspecified type  Comment: TSH 7.34  Plan: started on levothyroxine (SYNTHROID/LEVOTHROID) 50 MCG         Tablet as directed.explained clearly about the medication,insructions and side effects. Rpt TSH in 6 wks         (F41.9) Anxiety  Plan; sertraline (ZOLOFT) 50 MG tablet refilled as directed.explained clearly about the medication,insructions and side effects.         (E66.9) Obesity, Class II, BMI 35-39.9  Plan: Patient has started exercises and eating healthy. Discussed in detail about Diet,calorie intake,and regular exercise,        (F32.0) Mild major depression (H)  Comment: Stable  Plan: sertraline (ZOLOFT) 50 MG tablet refilled as directed.explained clearly about the medication,insructions and side effects.    Prescription drug management     BMI:   Estimated body mass index is 38.2 kg/m  as calculated from the following:    Height as of this encounter: 1.67 m (5' 5.75\").    Weight as of this encounter: 106.5 kg (234 lb 14.4 oz).   Weight management plan: Discussed healthy diet and exercise guidelines      Return in about 2 months (around 4/2/2022) for Physical Exam.    Giuseppe Greco MD  [facility] KYUNG Bonilla is a 26 year old who presents for the following health issues     HPI     Depression and Anxiety Follow-Up    How are you doing with your depression since your last visit? No change, on Zoloft 50 mg daily with good symptom relief    How are you doing with your anxiety since your last visit?  No change    Are you having other symptoms that might be associated with depression or anxiety? No    Have you had a significant life event? No     Do you have any concerns with your use of alcohol or other drugs? No    PHQ 6/1/2020 " "4/12/2021 2/2/2022   PHQ-9 Total Score 4 3 2   Q9: Thoughts of better off dead/self-harm past 2 weeks Not at all Not at all Not at all     JORGE-7 SCORE 1/6/2020 4/12/2021 2/2/2022   Total Score - - -   Total Score 1 2 4        History of migraines; follows up with a neurologist and is on gabapentin, patient is scheduling appointment soon      Past Medical History:   Diagnosis Date     Anxiety      Mild major depression (H)        Current Outpatient Medications   Medication Sig Dispense Refill     gabapentin (NEURONTIN) 100 MG capsule Take 600 mg by mouth 2 times daily Through neurologist       ANITHA 1/35 1-35 MG-MCG tablet TAKE 1 TABLET BY MOUTH EVERY DAY 84 tablet 0     sertraline (ZOLOFT) 50 MG tablet Take 1 tablet (50 mg) by mouth daily 90 tablet 1         Review of Systems   CONSTITUTIONAL: NEGATIVE for fever, chills, change in weight  RESP: NEGATIVE for significant cough or SOB  CV: NEGATIVE for chest pain, palpitations or peripheral edema  PSYCHIATRIC: NEGATIVE for changes in mood or affect      Objective    /81 (BP Location: Right arm, Patient Position: Chair, Cuff Size: Adult Large)   Pulse 68   Temp 97.9  F (36.6  C)   Resp 16   Ht 1.67 m (5' 5.75\")   Wt 106.5 kg (234 lb 14.4 oz)   LMP 01/16/2022   SpO2 98%   Breastfeeding No   BMI 38.20 kg/m    There is no height or weight on file to calculate BMI.  Physical Exam   GENERAL: healthy, alert and no distress  RESP: lungs clear to auscultation - no rales, rhonchi or wheezes  CV: regular rate and rhythm, normal S1 S2,   MS: no gross musculoskeletal defects noted, no edema  NEURO: Normal strength and tone, mentation intact and speech normal  PSYCH: mentation appears normal, affect normal/bright       "

## 2022-02-03 LAB
T4 FREE SERPL-MCNC: 1.01 NG/DL (ref 0.76–1.46)
TSH SERPL DL<=0.005 MIU/L-ACNC: 7.34 MU/L (ref 0.4–4)

## 2022-02-03 RX ORDER — LEVOTHYROXINE SODIUM 50 UG/1
50 TABLET ORAL DAILY
Qty: 60 TABLET | Refills: 0 | Status: SHIPPED | OUTPATIENT
Start: 2022-02-03 | End: 2022-04-03

## 2022-02-03 ASSESSMENT — ANXIETY QUESTIONNAIRES: GAD7 TOTAL SCORE: 4

## 2022-02-09 DIAGNOSIS — Z30.8 ENCOUNTER FOR OTHER CONTRACEPTIVE MANAGEMENT: ICD-10-CM

## 2022-02-10 RX ORDER — ETHYNODIOL DIACETATE AND ETHINYL ESTRADIOL 1 MG-35MCG
KIT ORAL
Qty: 84 TABLET | Refills: 3 | Status: SHIPPED | OUTPATIENT
Start: 2022-02-10 | End: 2023-01-10

## 2022-04-01 ENCOUNTER — LAB (OUTPATIENT)
Dept: LAB | Facility: CLINIC | Age: 27
End: 2022-04-01
Payer: COMMERCIAL

## 2022-04-01 DIAGNOSIS — E03.9 HYPOTHYROIDISM, UNSPECIFIED TYPE: ICD-10-CM

## 2022-04-01 PROCEDURE — 36415 COLL VENOUS BLD VENIPUNCTURE: CPT

## 2022-04-01 PROCEDURE — 84443 ASSAY THYROID STIM HORMONE: CPT

## 2022-04-01 PROCEDURE — 84439 ASSAY OF FREE THYROXINE: CPT

## 2022-04-03 DIAGNOSIS — E03.9 HYPOTHYROIDISM, UNSPECIFIED TYPE: ICD-10-CM

## 2022-04-03 LAB
T4 FREE SERPL-MCNC: 0.92 NG/DL (ref 0.76–1.46)
TSH SERPL DL<=0.005 MIU/L-ACNC: 5.13 MU/L (ref 0.4–4)

## 2022-04-03 RX ORDER — LEVOTHYROXINE SODIUM 50 UG/1
TABLET ORAL
Qty: 102 TABLET | Refills: 0 | Status: SHIPPED | OUTPATIENT
Start: 2022-04-03 | End: 2022-07-05

## 2022-04-16 ENCOUNTER — HEALTH MAINTENANCE LETTER (OUTPATIENT)
Age: 27
End: 2022-04-16

## 2022-06-30 DIAGNOSIS — E03.9 HYPOTHYROIDISM, UNSPECIFIED TYPE: ICD-10-CM

## 2022-07-05 NOTE — TELEPHONE ENCOUNTER
Pending Prescriptions:                       Disp   Refills    levothyroxine (SYNTHROID/LEVOTHROID) 50 MC*102 ta*0        Sig: TAKE ONE TABLET BY MOUTH EVERY DAY FOR 6 DAYS, AND           TAKE TWO TABLETS BY MOUTH DAY 7    Routing refill request to provider for review/approval because:  TSH   Date Value Ref Range Status   04/01/2022 5.13 (H) 0.40 - 4.00 mU/L Final   06/09/2020 6.54 (H) 0.40 - 4.00 mU/L Final

## 2022-07-07 RX ORDER — LEVOTHYROXINE SODIUM 50 UG/1
TABLET ORAL
Qty: 40 TABLET | Refills: 0 | Status: SHIPPED | OUTPATIENT
Start: 2022-07-07 | End: 2022-08-29 | Stop reason: DRUGHIGH

## 2022-08-16 ENCOUNTER — MYC MEDICAL ADVICE (OUTPATIENT)
Dept: INTERNAL MEDICINE | Facility: CLINIC | Age: 27
End: 2022-08-16

## 2022-08-23 ENCOUNTER — LAB (OUTPATIENT)
Dept: LAB | Facility: CLINIC | Age: 27
End: 2022-08-23
Payer: COMMERCIAL

## 2022-08-23 DIAGNOSIS — E03.9 HYPOTHYROIDISM, UNSPECIFIED TYPE: ICD-10-CM

## 2022-08-23 LAB
T4 FREE SERPL-MCNC: 0.98 NG/DL (ref 0.76–1.46)
TSH SERPL DL<=0.005 MIU/L-ACNC: 5.42 MU/L (ref 0.4–4)

## 2022-08-23 PROCEDURE — 84439 ASSAY OF FREE THYROXINE: CPT

## 2022-08-23 PROCEDURE — 84443 ASSAY THYROID STIM HORMONE: CPT

## 2022-08-23 PROCEDURE — 36415 COLL VENOUS BLD VENIPUNCTURE: CPT

## 2022-08-29 DIAGNOSIS — E03.9 HYPOTHYROIDISM, UNSPECIFIED TYPE: Primary | ICD-10-CM

## 2022-08-29 RX ORDER — LEVOTHYROXINE SODIUM 75 UG/1
75 TABLET ORAL DAILY
Qty: 90 TABLET | Refills: 0 | Status: SHIPPED | OUTPATIENT
Start: 2022-08-29 | End: 2022-11-30

## 2022-09-27 ENCOUNTER — VIRTUAL VISIT (OUTPATIENT)
Dept: INTERNAL MEDICINE | Facility: CLINIC | Age: 27
End: 2022-09-27
Payer: COMMERCIAL

## 2022-09-27 DIAGNOSIS — F32.0 MILD MAJOR DEPRESSION (H): ICD-10-CM

## 2022-09-27 DIAGNOSIS — R53.83 OTHER FATIGUE: Primary | ICD-10-CM

## 2022-09-27 DIAGNOSIS — F41.9 ANXIETY: ICD-10-CM

## 2022-09-27 PROCEDURE — 99214 OFFICE O/P EST MOD 30 MIN: CPT | Mod: 95 | Performed by: INTERNAL MEDICINE

## 2022-09-27 RX ORDER — GABAPENTIN 300 MG/1
600 CAPSULE ORAL DAILY
COMMUNITY
Start: 2022-08-31

## 2022-09-27 ASSESSMENT — PATIENT HEALTH QUESTIONNAIRE - PHQ9: SUM OF ALL RESPONSES TO PHQ QUESTIONS 1-9: 3

## 2022-09-27 NOTE — PROGRESS NOTES
Irene is a 26 year old who is being evaluated via a billable video visit.      How would you like to obtain your AVS? MyChart  If the video visit is dropped, the invitation should be resent by: Text to cell phone: [phone]  Will anyone else be joining your video visit? No          Assessment & Plan     (R53.83) Other fatigue   Plan: CBC with platelets, TSH with free T4 reflex, Vitamin B12, Iron & Iron Binding Capacity.pt was told I will contact her after results and proceed accordingly.     (F32.0) Mild major depression (H)  (F41.9) Anxiety  Plan: stable, refilled sertraline (ZOLOFT) 50 MG tablet as directed.explained clearly about the medication,insructions and side effects.              Ordering of each unique test  Prescription drug management        Return in about 3 months (around 1/9/2023) for Physical Exam and PAP.    Giuseppe Greco MD  [facility]   Irene is a 26 year old presenting for the following health issues:  Consult For      HPI     Depression Followup    How are you doing with your depression since your last visit? No change    Are you having other symptoms that might be associated with depression? No    Have you had a significant life event?  No     Are you feeling anxious or having panic attacks?   No    Do you have any concerns with your use of alcohol or other drugs? No    PHQ 4/12/2021 2/2/2022 9/27/2022   PHQ-9 Total Score 3 2 3   Q9: Thoughts of better off dead/self-harm past 2 weeks Not at all Not at all Not at all        56}  Hypothyroidism Follow-up      Since last visit, patient describes the following symptoms: Weight loss with diet and exercise( pt states she lost about 30 lbs) . , no hair loss, no skin changes, no constipation, no loose stools       Pt c/o fatigue and feeling tired in the last  2 months.  No shortness of breath, patient thinks that she may have low iron levels and would like blood work, has regular menses,             Past Medical History:   Diagnosis Date     Anxiety      Mild major depression (H)        Current Outpatient Medications   Medication Sig Dispense Refill     gabapentin (NEURONTIN) 300 MG capsule Take 600 mg by mouth 2 times daily Through neurologist ([facility]) for restless legs and migraines       KELNOR 1/35 1-35 MG-MCG tablet TAKE 1 TABLET BY MOUTH EVERY DAY 84 tablet 3     levothyroxine (SYNTHROID/LEVOTHROID) 75 MCG tablet Take 1 tablet (75 mcg) by mouth daily 90 tablet 0     sertraline (ZOLOFT) 50 MG tablet Take 1 tablet (50 mg) by mouth daily 90 tablet 1        Review of Systems   CONSTITUTIONAL: Fatigue and tired  RESP: NEGATIVE for significant cough or SOB  CV: NEGATIVE for chest pain, palpitations or peripheral edema  PSYCHIATRIC: NEGATIVE for changes in mood or affect      Objective           Vitals:  No vitals were obtained today due to virtual visit.    Physical Exam   GENERAL: Healthy, alert and no distress  EYES: Eyes grossly normal to inspection.  No discharge or erythema, or obvious scleral/conjunctival abnormalities.  RESP: No audible wheeze, cough, or visible cyanosis.  No visible retractions or increased work of breathing.    PSYCH: Mentation appears normal, affect normal/bright, judgement and insight intact, normal speech and appearance well-groomed.       Video-Visit Details    Video Start Time: 11:41 AM    Type of service:  Video Visit    Video End Time:11:53 AM    Originating Location (pt. Location): Home    Distant Location (provider location):  [facility]     Platform used for Video Visit: Neha

## 2022-09-29 ENCOUNTER — LAB (OUTPATIENT)
Dept: LAB | Facility: CLINIC | Age: 27
End: 2022-09-29
Payer: COMMERCIAL

## 2022-09-29 DIAGNOSIS — R53.83 OTHER FATIGUE: ICD-10-CM

## 2022-09-29 LAB
ERYTHROCYTE [DISTWIDTH] IN BLOOD BY AUTOMATED COUNT: 11.9 % (ref 10–15)
HCT VFR BLD AUTO: 41.1 % (ref 35–47)
HGB BLD-MCNC: 13.9 G/DL (ref 11.7–15.7)
IRON SATN MFR SERPL: 25 % (ref 15–46)
IRON SERPL-MCNC: 89 UG/DL (ref 35–180)
MCH RBC QN AUTO: 29.1 PG (ref 26.5–33)
MCHC RBC AUTO-ENTMCNC: 33.8 G/DL (ref 31.5–36.5)
MCV RBC AUTO: 86 FL (ref 78–100)
PLATELET # BLD AUTO: 282 10E3/UL (ref 150–450)
RBC # BLD AUTO: 4.77 10E6/UL (ref 3.8–5.2)
TIBC SERPL-MCNC: 356 UG/DL (ref 240–430)
VIT B12 SERPL-MCNC: 525 PG/ML (ref 232–1245)
WBC # BLD AUTO: 5.4 10E3/UL (ref 4–11)

## 2022-09-29 PROCEDURE — 36415 COLL VENOUS BLD VENIPUNCTURE: CPT

## 2022-09-29 PROCEDURE — 84443 ASSAY THYROID STIM HORMONE: CPT

## 2022-09-29 PROCEDURE — 83550 IRON BINDING TEST: CPT

## 2022-09-29 PROCEDURE — 82607 VITAMIN B-12: CPT

## 2022-09-29 PROCEDURE — 85027 COMPLETE CBC AUTOMATED: CPT

## 2022-09-30 DIAGNOSIS — F41.9 ANXIETY: ICD-10-CM

## 2022-09-30 DIAGNOSIS — F32.0 MILD MAJOR DEPRESSION (H): ICD-10-CM

## 2022-09-30 LAB — TSH SERPL DL<=0.005 MIU/L-ACNC: 3.26 MU/L (ref 0.4–4)

## 2022-09-30 NOTE — TELEPHONE ENCOUNTER
Sent to [facility] on 9/27/22 per patient request. E-Prescribing Status: Receipt confirmed by pharmacy (9/27/2022 11:50 AM CDT)    Edgar PATIÑO RN, BSN

## 2022-10-16 ENCOUNTER — HEALTH MAINTENANCE LETTER (OUTPATIENT)
Age: 27
End: 2022-10-16

## 2022-12-11 ENCOUNTER — MYC REFILL (OUTPATIENT)
Dept: INTERNAL MEDICINE | Facility: CLINIC | Age: 27
End: 2022-12-11

## 2022-12-11 DIAGNOSIS — E03.9 HYPOTHYROIDISM, UNSPECIFIED TYPE: ICD-10-CM

## 2022-12-13 RX ORDER — LEVOTHYROXINE SODIUM 75 UG/1
TABLET ORAL
Qty: 90 TABLET | Refills: 0 | OUTPATIENT
Start: 2022-12-13

## 2023-01-09 DIAGNOSIS — Z30.8 ENCOUNTER FOR OTHER CONTRACEPTIVE MANAGEMENT: ICD-10-CM

## 2023-01-10 RX ORDER — ETHYNODIOL DIACETATE AND ETHINYL ESTRADIOL 1 MG-35MCG
1 KIT ORAL DAILY
Qty: 84 TABLET | Refills: 0 | Status: SHIPPED | OUTPATIENT
Start: 2023-01-10 | End: 2023-04-18

## 2023-01-10 NOTE — TELEPHONE ENCOUNTER
Last in clinic viist 02/22, patient is due for Pap this month.  Pl advise pt to make appointment in clinic, refill done until she makes appointment

## 2023-01-19 DIAGNOSIS — Z30.8 ENCOUNTER FOR OTHER CONTRACEPTIVE MANAGEMENT: ICD-10-CM

## 2023-01-20 RX ORDER — ETHYNODIOL DIACETATE AND ETHINYL ESTRADIOL 1 MG-35MCG
KIT ORAL
Qty: 84 TABLET | Refills: 3 | OUTPATIENT
Start: 2023-01-20

## 2023-01-20 NOTE — TELEPHONE ENCOUNTER
Refused, duplicate request. Last filled on 1/10/23 by Dr. Greco. Patient needs annual exam for further refills as well, was given magdiel period on 1/10/23.    Ishan Alvarez RN

## 2023-03-17 ENCOUNTER — MYC MEDICAL ADVICE (OUTPATIENT)
Dept: INTERNAL MEDICINE | Facility: CLINIC | Age: 28
End: 2023-03-17
Payer: COMMERCIAL

## 2023-04-07 DIAGNOSIS — F41.9 ANXIETY: ICD-10-CM

## 2023-04-07 DIAGNOSIS — F32.0 MILD MAJOR DEPRESSION (H): ICD-10-CM

## 2023-04-08 DIAGNOSIS — F32.0 MILD MAJOR DEPRESSION (H): ICD-10-CM

## 2023-04-08 DIAGNOSIS — F41.9 ANXIETY: ICD-10-CM

## 2023-04-10 NOTE — TELEPHONE ENCOUNTER
Patient is overdue for appointment/med check/physical/pap , I have refilled for 1 month only, please schedule patient for appointment to see me in clinic . Can schedule on my same day slot or 11;30 slot for in clinic appt.  Also please send PHQ 9 through My chart

## 2023-04-10 NOTE — TELEPHONE ENCOUNTER
Left message for patient to call back and detailed mychart message sent to patient along with PHQ-9.

## 2023-04-10 NOTE — TELEPHONE ENCOUNTER
Routing refill request to provider for review/approval because:  PHQ-9 score:      9/27/2022    11:48 AM   PHQ   PHQ-9 Total Score 3   Q9: Thoughts of better off dead/self-harm past 2 weeks Not at all

## 2023-04-10 NOTE — TELEPHONE ENCOUNTER
Pending Prescriptions:                       Disp   Refills    sertraline (ZOLOFT) 50 MG tablet [Pharmac*90 tab*0            Sig: TAKE 1 TABLET BY MOUTH EVERY DAY    Duplicate request.  Medication refilled today.  Spoke with Enmanuel at pharmacy and he confirmed prescription received earlier today.

## 2023-04-17 ASSESSMENT — ENCOUNTER SYMPTOMS
MYALGIAS: 0
COUGH: 0
CONSTIPATION: 0
EYE PAIN: 0
SORE THROAT: 0
NERVOUS/ANXIOUS: 0
CHILLS: 0
DIZZINESS: 1
HEMATURIA: 0
FEVER: 0
PARESTHESIAS: 0
HEARTBURN: 0
FREQUENCY: 0
JOINT SWELLING: 0
DIARRHEA: 0
HEMATOCHEZIA: 0
HEADACHES: 1
ABDOMINAL PAIN: 0
ARTHRALGIAS: 0
DYSURIA: 0
WEAKNESS: 0
BREAST MASS: 0
SHORTNESS OF BREATH: 0
NAUSEA: 0
PALPITATIONS: 0

## 2023-04-18 ENCOUNTER — OFFICE VISIT (OUTPATIENT)
Dept: INTERNAL MEDICINE | Facility: CLINIC | Age: 28
End: 2023-04-18
Payer: COMMERCIAL

## 2023-04-18 VITALS
OXYGEN SATURATION: 98 % | DIASTOLIC BLOOD PRESSURE: 84 MMHG | WEIGHT: 204.7 LBS | BODY MASS INDEX: 32.9 KG/M2 | SYSTOLIC BLOOD PRESSURE: 122 MMHG | HEART RATE: 88 BPM | TEMPERATURE: 97.9 F | HEIGHT: 66 IN

## 2023-04-18 DIAGNOSIS — B96.89 BACTERIAL VAGINOSIS: ICD-10-CM

## 2023-04-18 DIAGNOSIS — F32.5 MAJOR DEPRESSIVE DISORDER IN FULL REMISSION, UNSPECIFIED WHETHER RECURRENT (H): ICD-10-CM

## 2023-04-18 DIAGNOSIS — Z00.00 ROUTINE HISTORY AND PHYSICAL EXAMINATION OF ADULT: Primary | ICD-10-CM

## 2023-04-18 DIAGNOSIS — F41.9 ANXIETY: ICD-10-CM

## 2023-04-18 DIAGNOSIS — Z30.8 ENCOUNTER FOR OTHER CONTRACEPTIVE MANAGEMENT: ICD-10-CM

## 2023-04-18 DIAGNOSIS — Z12.4 CERVICAL CANCER SCREENING: ICD-10-CM

## 2023-04-18 DIAGNOSIS — N89.8 VAGINAL DISCHARGE: ICD-10-CM

## 2023-04-18 DIAGNOSIS — N76.0 BACTERIAL VAGINOSIS: ICD-10-CM

## 2023-04-18 DIAGNOSIS — E03.9 HYPOTHYROIDISM, UNSPECIFIED TYPE: ICD-10-CM

## 2023-04-18 LAB
ALBUMIN SERPL BCG-MCNC: 4.5 G/DL (ref 3.5–5.2)
ALP SERPL-CCNC: 63 U/L (ref 35–104)
ALT SERPL W P-5'-P-CCNC: 23 U/L (ref 10–35)
ANION GAP SERPL CALCULATED.3IONS-SCNC: 12 MMOL/L (ref 7–15)
AST SERPL W P-5'-P-CCNC: 27 U/L (ref 10–35)
BILIRUB SERPL-MCNC: 0.6 MG/DL
BUN SERPL-MCNC: 13.9 MG/DL (ref 6–20)
CALCIUM SERPL-MCNC: 9.5 MG/DL (ref 8.6–10)
CHLORIDE SERPL-SCNC: 105 MMOL/L (ref 98–107)
CHOLEST SERPL-MCNC: 205 MG/DL
CLUE CELLS: PRESENT
CREAT SERPL-MCNC: 0.85 MG/DL (ref 0.51–0.95)
DEPRECATED HCO3 PLAS-SCNC: 21 MMOL/L (ref 22–29)
ERYTHROCYTE [DISTWIDTH] IN BLOOD BY AUTOMATED COUNT: 12.4 % (ref 10–15)
GFR SERPL CREATININE-BSD FRML MDRD: >90 ML/MIN/1.73M2
GLUCOSE SERPL-MCNC: 87 MG/DL (ref 70–99)
HCT VFR BLD AUTO: 42.2 % (ref 35–47)
HDLC SERPL-MCNC: 47 MG/DL
HGB BLD-MCNC: 14.4 G/DL (ref 11.7–15.7)
LDLC SERPL CALC-MCNC: 133 MG/DL
MCH RBC QN AUTO: 29.6 PG (ref 26.5–33)
MCHC RBC AUTO-ENTMCNC: 34.1 G/DL (ref 31.5–36.5)
MCV RBC AUTO: 87 FL (ref 78–100)
NONHDLC SERPL-MCNC: 158 MG/DL
PLATELET # BLD AUTO: 268 10E3/UL (ref 150–450)
POTASSIUM SERPL-SCNC: 4.1 MMOL/L (ref 3.4–5.3)
PROT SERPL-MCNC: 7.3 G/DL (ref 6.4–8.3)
RBC # BLD AUTO: 4.86 10E6/UL (ref 3.8–5.2)
SODIUM SERPL-SCNC: 138 MMOL/L (ref 136–145)
TRICHOMONAS, WET PREP: ABNORMAL
TRIGL SERPL-MCNC: 124 MG/DL
TSH SERPL DL<=0.005 MIU/L-ACNC: 3.99 UIU/ML (ref 0.3–4.2)
WBC # BLD AUTO: 6.6 10E3/UL (ref 4–11)
WBC'S/HIGH POWER FIELD, WET PREP: ABNORMAL
YEAST, WET PREP: ABNORMAL

## 2023-04-18 PROCEDURE — 85027 COMPLETE CBC AUTOMATED: CPT | Performed by: INTERNAL MEDICINE

## 2023-04-18 PROCEDURE — 36415 COLL VENOUS BLD VENIPUNCTURE: CPT | Performed by: INTERNAL MEDICINE

## 2023-04-18 PROCEDURE — 80053 COMPREHEN METABOLIC PANEL: CPT | Performed by: INTERNAL MEDICINE

## 2023-04-18 PROCEDURE — 87210 SMEAR WET MOUNT SALINE/INK: CPT | Performed by: INTERNAL MEDICINE

## 2023-04-18 PROCEDURE — 99213 OFFICE O/P EST LOW 20 MIN: CPT | Mod: 25 | Performed by: INTERNAL MEDICINE

## 2023-04-18 PROCEDURE — 99395 PREV VISIT EST AGE 18-39: CPT | Performed by: INTERNAL MEDICINE

## 2023-04-18 PROCEDURE — G0145 SCR C/V CYTO,THINLAYER,RESCR: HCPCS | Performed by: INTERNAL MEDICINE

## 2023-04-18 PROCEDURE — 84443 ASSAY THYROID STIM HORMONE: CPT | Performed by: INTERNAL MEDICINE

## 2023-04-18 PROCEDURE — 80061 LIPID PANEL: CPT | Performed by: INTERNAL MEDICINE

## 2023-04-18 RX ORDER — TOPIRAMATE 50 MG/1
50 TABLET, FILM COATED ORAL 2 TIMES DAILY
COMMUNITY

## 2023-04-18 RX ORDER — ETHYNODIOL DIACETATE AND ETHINYL ESTRADIOL 1 MG-35MCG
1 KIT ORAL DAILY
Qty: 84 TABLET | Refills: 3 | Status: SHIPPED | OUTPATIENT
Start: 2023-04-18 | End: 2024-04-02

## 2023-04-18 RX ORDER — METRONIDAZOLE 500 MG/1
500 TABLET ORAL 2 TIMES DAILY
Qty: 14 TABLET | Refills: 0 | Status: SHIPPED | OUTPATIENT
Start: 2023-04-18 | End: 2023-04-25

## 2023-04-18 ASSESSMENT — ENCOUNTER SYMPTOMS
CONSTIPATION: 0
DYSURIA: 0
PALPITATIONS: 0
SHORTNESS OF BREATH: 0
ARTHRALGIAS: 0
HEARTBURN: 0
WEAKNESS: 0
HEADACHES: 1
FREQUENCY: 0
HEMATOCHEZIA: 0
ABDOMINAL PAIN: 0
CHILLS: 0
JOINT SWELLING: 0
MYALGIAS: 0
PARESTHESIAS: 0
HEMATURIA: 0
NERVOUS/ANXIOUS: 0
SORE THROAT: 0
EYE PAIN: 0
FEVER: 0
NAUSEA: 0
DIARRHEA: 0
COUGH: 0
BREAST MASS: 0
DIZZINESS: 1

## 2023-04-18 ASSESSMENT — ANXIETY QUESTIONNAIRES
GAD7 TOTAL SCORE: 1
7. FEELING AFRAID AS IF SOMETHING AWFUL MIGHT HAPPEN: NOT AT ALL
5. BEING SO RESTLESS THAT IT IS HARD TO SIT STILL: NOT AT ALL
2. NOT BEING ABLE TO STOP OR CONTROL WORRYING: NOT AT ALL
3. WORRYING TOO MUCH ABOUT DIFFERENT THINGS: SEVERAL DAYS
1. FEELING NERVOUS, ANXIOUS, OR ON EDGE: NOT AT ALL
6. BECOMING EASILY ANNOYED OR IRRITABLE: NOT AT ALL
IF YOU CHECKED OFF ANY PROBLEMS ON THIS QUESTIONNAIRE, HOW DIFFICULT HAVE THESE PROBLEMS MADE IT FOR YOU TO DO YOUR WORK, TAKE CARE OF THINGS AT HOME, OR GET ALONG WITH OTHER PEOPLE: NOT DIFFICULT AT ALL
GAD7 TOTAL SCORE: 1

## 2023-04-18 ASSESSMENT — PATIENT HEALTH QUESTIONNAIRE - PHQ9: 5. POOR APPETITE OR OVEREATING: NOT AT ALL

## 2023-04-18 NOTE — PROGRESS NOTES
SUBJECTIVE:   CC: Irene is an 27 year old who presents for preventive health visit.        View : No data to display.              Patient has been advised of split billing requirements and indicates understanding: Yes  Healthy Habits:     Getting at least 3 servings of Calcium per day:  Yes    Bi-annual eye exam:  Yes    Dental care twice a year:  Yes    Sleep apnea or symptoms of sleep apnea:  None    Diet:  Low fat/cholesterol    Frequency of exercise:  4-5 days/week    Duration of exercise:  45-60 minutes    Taking medications regularly:  Yes    Medication side effects:  None    PHQ-2 Total Score: 0    Additional concerns today:  No      Depression and Anxiety Follow-Up    How are you doing with your depression since your last visit? improved    How are you doing with your anxiety since your last visit?  improved     Are you having other symptoms that might be associated with depression or anxiety? No    Have you had a significant life event? No     Do you have any concerns with your use of alcohol or other drugs? No        2/2/2022     1:05 PM 9/27/2022    11:48 AM 4/10/2023     5:48 PM   PHQ   PHQ-9 Total Score 2 3 1   Q9: Thoughts of better off dead/self-harm past 2 weeks Not at all Not at all Not at all         4/12/2021    11:59 AM 2/2/2022     1:05 PM 4/18/2023    11:32 AM   JORGE-7 SCORE   Total Score 2 4 1         Hypothyroidism Follow-up      Since last visit, patient describes the following symptoms:   no hair loss, no skin changes, no constipation, no loose stools, has weight loss as patient is trying to lose weight with diet and exercise      History of migraine headaches, follows up with a neurologist and is on gabapentin and Topamax      Also requesting refills on birth control pills.      Today's PHQ-2 Score:       4/17/2023     6:38 PM   PHQ-2 ( 1999 [employer])   Q1: Little interest or pleasure in doing things 0   Q2: Feeling down, depressed or hopeless 0   PHQ-2 Score 0   Q1: Little interest or  pleasure in doing things Not at all   Q2: Feeling down, depressed or hopeless Not at all   PHQ-2 Score 0       Have you ever done Advance Care Planning? (For example, a Health Directive, POLST, or a discussion with a medical provider or your loved ones about your wishes): No, advance care planning information given to patient to review.  Patient declined advance care planning discussion at this time.      Past Medical History:   Diagnosis Date     Anxiety      Mild major depression (H)      Thyroid disease Feb 2022    Hypothyroid       No past surgical history on file.    Current Outpatient Medications   Medication Sig Dispense Refill     ethynodiol-ethinyl estradiol (KELNOR 1/35) 1-35 MG-MCG tablet Take 1 tablet by mouth daily 84 tablet 3     gabapentin (NEURONTIN) 300 MG capsule Take 600 mg by mouth daily Through neurologist ([facility], [facility]) for restless legs and migraines       levothyroxine (SYNTHROID/LEVOTHROID) 75 MCG tablet TAKE ONE TABLET BY MOUTH ONCE EVERY DAY 90 tablet 1     metroNIDAZOLE (FLAGYL) 500 MG tablet Take 1 tablet (500 mg) by mouth 2 times daily for 7 days 14 tablet 0     sertraline (ZOLOFT) 50 MG tablet Take 1 tablet (50 mg) by mouth daily 90 tablet 1     topiramate (TOPAMAX) 50 MG tablet Take 50 mg by mouth 2 times daily Through neurologist ([facility])         Family History   Problem Relation Age of Onset     Diabetes Other      Thyroid Disease Maternal Grandmother      Family History Negative Mother      Family History Negative Father      Hypertension Father      Cerebrovascular Disease Maternal Grandfather        Social History     Tobacco Use     Smoking status: Never     Smokeless tobacco: Never   Vaping Use     Vaping status: Never Used   Substance Use Topics     Alcohol use: Yes     Comment: 0-1 drinks per week             4/17/2023     6:38 PM   Alcohol Use   Prescreen: >3 drinks/day or >7 drinks/week? No          View : No data to  "display.              Reviewed orders with patient.  Reviewed health maintenance and updated orders accordingly - Yes  Lab work is in process    Breast Cancer Screenin/17/2023     6:38 PM   Breast CA Risk Assessment (FHS-7)   Do you have a family history of breast, colon, or ovarian cancer? No / Unknown         Patient under 40 years of age: Routine Mammogram Screening not recommended.        History of abnormal Pap smear: NO - age 21-29 PAP every 3 years recommended      2020     9:04 AM   PAP / HPV   PAP (Historical) NIL       Reviewed and updated as needed this visit by clinical staff                  Reviewed and updated as needed this visit by Provider                     Review of Systems   Constitutional: Negative for chills and fever.   HENT: Negative for congestion, ear pain, hearing loss and sore throat.    Eyes: Positive for visual disturbance. Negative for pain.        Follows up with the ophthalmologist   Respiratory: Negative for cough and shortness of breath.    Cardiovascular: Negative for chest pain, palpitations and peripheral edema.   Gastrointestinal: Negative for abdominal pain, constipation, diarrhea, heartburn, hematochezia and nausea.   Breasts:  Negative for tenderness, breast mass and discharge.   Genitourinary: Negative for dysuria, frequency, genital sores, hematuria, pelvic pain, urgency, vaginal bleeding and vaginal discharge.   Musculoskeletal: Negative for arthralgias, joint swelling and myalgias.   Skin: Negative for rash.   Neurological: Positive for dizziness and headaches. Negative for weakness and paresthesias.        Follows up with neurologist   Psychiatric/Behavioral: Negative for mood changes. The patient is not nervous/anxious.         OBJECTIVE:   /84   Pulse 88   Temp 97.9  F (36.6  C) (Tympanic)   Ht 1.67 m (5' 5.75\")   LMP 2023   SpO2 98%   BMI 38.20 kg/m    Physical Exam  GENERAL: healthy, alert and no distress  EYES: Eyes grossly " normal to inspection, PERRL and conjunctivae and sclerae normal  HENT: ear canals and TM's normal,   RESP: lungs clear to auscultation - no rales, rhonchi or wheezes  BREAST: normal without masses, tenderness or nipple discharge and no palpable axillary masses or adenopathy  CV: regular rate and rhythm, normal S1 S2,    ABDOMEN: soft, nontender,  and bowel sounds normal   (female): normal female external genitalia, normal urethral meatus, vaginal mucosa pink, moist, well rugated, and normal cervix/adnexa  without masses, vagi discharge noted, wet prep done, pap obtained   MS: no gross musculoskeletal defects noted, no edema  NEURO: Normal strength and tone, mentation intact and speech normal  PSYCH: mentation appears normal, affect normal/bright     Wet prep- Clue cells present       ASSESSMENT/PLAN:       (Z00.00) Routine history and physical examination of adult  (primary encounter diagnosis)  Plan: CBC with platelets, TSH with free T4 reflex,         Lipid panel reflex to direct LDL Fasting,         Comprehensive metabolic panel            (Z12.4) Cervical cancer screening  Plan: Pap Screen reflex to HPV if ASCUS - recommend         age 25 - 29            (F32.5) Major depressive disorder in full remission, unspecified whether recurrent (H)  (F41.9) Anxiety  Comment: stable  Plan: sertraline (ZOLOFT) 50 MG tablet refilled as directed.explained clearly about the medication,insructions and side effects.      (E03.9) Hypothyroidism, unspecified type  Plan: check TSH with free T4 reflex, on levoxyl 75 mcg daily, adjust levoxyl dose after results       (Z30.8) Encounter for other contraceptive management  Plan: ethynodiol-ethinyl estradiol (KELNOR 1/35) 1-35        MG-MCG tablet refilled.explained clearly about the medication,insructions and side effects.             (N89.8) Vaginal discharge  Plan: Wet prep - Clinic Collect             (N76.0,  B96.89) Bacterial vaginosis  Plan: started on metroNIDAZOLE (FLAGYL)  500 MG tablet bid as directed.explained clearly about the medication,insructions and side effects.  Patient was advised not to consume alcohol while on this medication.               Patient has been advised of split billing requirements and indicates understanding: Yes      COUNSELING:  Reviewed preventive health counseling, as reflected in patient instructions       Regular exercise       Healthy diet/nutrition        She reports that she has never smoked. She has never used smokeless tobacco.          Giuseppe Greco MD  [facility]

## 2023-04-18 NOTE — NURSING NOTE
"/84   Pulse 88   Temp 97.9  F (36.6  C) (Tympanic)   Ht 1.67 m (5' 5.75\")   Wt 92.9 kg (204 lb 11.2 oz)   LMP 04/13/2023   SpO2 98%   BMI 33.29 kg/m      "

## 2023-04-20 LAB
BKR LAB AP GYN ADEQUACY: NORMAL
BKR LAB AP GYN INTERPRETATION: NORMAL
BKR LAB AP HPV REFLEX: NORMAL
BKR LAB AP PREVIOUS ABNORMAL: NORMAL
PATH REPORT.COMMENTS IMP SPEC: NORMAL
PATH REPORT.COMMENTS IMP SPEC: NORMAL
PATH REPORT.RELEVANT HX SPEC: NORMAL

## 2023-07-31 ENCOUNTER — MYC MEDICAL ADVICE (OUTPATIENT)
Dept: FAMILY MEDICINE | Facility: CLINIC | Age: 28
End: 2023-07-31
Payer: COMMERCIAL

## 2023-08-03 ENCOUNTER — OFFICE VISIT (OUTPATIENT)
Dept: FAMILY MEDICINE | Facility: CLINIC | Age: 28
End: 2023-08-03
Payer: COMMERCIAL

## 2023-08-03 VITALS
HEIGHT: 66 IN | RESPIRATION RATE: 18 BRPM | WEIGHT: 199 LBS | DIASTOLIC BLOOD PRESSURE: 72 MMHG | BODY MASS INDEX: 31.98 KG/M2 | TEMPERATURE: 97.8 F | HEART RATE: 88 BPM | OXYGEN SATURATION: 99 % | SYSTOLIC BLOOD PRESSURE: 108 MMHG

## 2023-08-03 DIAGNOSIS — J35.8 TONSIL STONE: ICD-10-CM

## 2023-08-03 DIAGNOSIS — H69.90 DYSFUNCTION OF EUSTACHIAN TUBE, UNSPECIFIED LATERALITY: Primary | ICD-10-CM

## 2023-08-03 PROCEDURE — 99213 OFFICE O/P EST LOW 20 MIN: CPT | Performed by: NURSE PRACTITIONER

## 2023-08-03 RX ORDER — FLUTICASONE PROPIONATE 50 MCG
1 SPRAY, SUSPENSION (ML) NASAL DAILY
Qty: 16 G | Refills: 5 | Status: SHIPPED | OUTPATIENT
Start: 2023-08-03

## 2023-08-03 NOTE — PROGRESS NOTES
"  Assessment & Plan     Monique was seen today for ent problem.    Diagnoses and all orders for this visit:    Dysfunction of Eustachian tube, unspecified laterality  Recommend that patient schedule oral antihistamine - Claritin and use Flonase as well on a consistent daily basis.    -     fluticasone (FLONASE) 50 MCG/ACT nasal spray; Spray 1 spray into both nostrils daily    Tonsil stone  Plan further consultation with ENT.    -     Adult ENT  Referral; Future    BMI:   Estimated body mass index is 32.36 kg/m  as calculated from the following:    Height as of this encounter: 1.67 m (5' 5.75\").    Weight as of this encounter: 90.3 kg (199 lb).     Return in about 4 weeks (around 8/31/2023) for consultation with ENT.    Joanie Vega, [facility] BEBETO Bonilla is a 27 year old, presenting for the following health issues:  Ent Problem        8/3/2023     8:17 AM   Additional Questions   Roomed by Tri CHRIS     History of Present Illness     Onset 6 months ago.   Tonsils feel inflamed and large, difficulty with swallowing and talking, large tonsil stones - this all happens intermittently.  Malodorous tonsil stones.    Most recently, right ear feels plugged or congested.    +Seasonal allergies, nasal congestion.  Horrible allergies to cats.    Takes OTC antihistamine only as needed.      Reason for visit:  Tonsil issues (stones) along with ear plugging.  Right is getting worse- no pain just feeling plugged. Has been trying to get into ENT they are booked out for many months so decided to come here first.  Symptom onset:  More than a month  Symptoms include:  Tonsil issues and ear plugging, has alot of wax.  Symptom intensity:  Moderate  Symptom progression:  Worsening  Had these symptoms before:  No    She eats 2-3 servings of fruits and vegetables daily.She consumes 1 sweetened beverage(s) daily.She exercises with enough effort to increase her heart rate 20 to 29 " "minutes per day.  She exercises with enough effort to increase her heart rate 4 days per week.   She is taking medications regularly.       Review of Systems     Constitutional, HEENT, cardiovascular, pulmonary, gi and gu systems are negative, except as otherwise noted.      Objective    /72   Pulse 88   Temp 97.8  F (36.6  C)   Resp 18   Ht 1.67 m (5' 5.75\")   Wt 90.3 kg (199 lb)   LMP 07/09/2023   SpO2 99%   BMI 32.36 kg/m    Body mass index is 32.36 kg/m .    Physical Exam     GENERAL: healthy, alert and no distress  EYES: Eyes grossly normal to inspection  HENT: ear canals and TM's normal, nose with swelling of turbinates (r>l), and mouth without ulcers or lesions, no visible erythema or swelling of tonsils, no visible tonsillary stones  NECK: no adenopathy, no asymmetry  RESP: lungs clear to auscultation - no rales, rhonchi or wheezes  CV: regular rate and rhythm, normal S1 S2  PSYCH: mentation appears normal, affect normal/bright                  "

## 2023-09-18 DIAGNOSIS — E03.9 HYPOTHYROIDISM, UNSPECIFIED TYPE: ICD-10-CM

## 2023-09-18 RX ORDER — LEVOTHYROXINE SODIUM 75 UG/1
75 TABLET ORAL DAILY
Qty: 90 TABLET | Refills: 1 | Status: SHIPPED | OUTPATIENT
Start: 2023-09-18 | End: 2024-04-30

## 2023-09-18 NOTE — TELEPHONE ENCOUNTER
Called and spoke with patient to relay provider message. Patient verbalizes understanding of instructions and indicates no further questions at this time. She will follow up if symptoms persist.     Thank you,  Augusto Perry, Triage RN Aren Simmons  1:10 PM 9/18/2023

## 2023-09-18 NOTE — TELEPHONE ENCOUNTER
Medication refilled, being off of levothyroxine for 3 days should not cause the symptoms.  Medication has been faxed please inform patient

## 2023-09-18 NOTE — TELEPHONE ENCOUNTER
Patient calls to request urgent refill as she has been off medication for 3 days. Patient states she feels dizzy/disoriented and nausea the last two days after being off the medication.    Thank you,  Augusto Perry, Triage RN [facility]  11:39 AM 9/18/2023

## 2023-09-23 ENCOUNTER — TELEPHONE (OUTPATIENT)
Dept: NURSING | Facility: CLINIC | Age: 28
End: 2023-09-23
Payer: COMMERCIAL

## 2023-09-23 NOTE — TELEPHONE ENCOUNTER
Telephone call    Patient calling to request refill for her gabapentin that was historical  and was ordered by her Neurologist.  So Directed her to call the Neurologist office. She had the number and said she would.    Loreta Wasserman RN   [facility] Nurse Advisor  8:16 AM 9/23/2023

## 2023-10-24 ENCOUNTER — VIRTUAL VISIT (OUTPATIENT)
Dept: INTERNAL MEDICINE | Facility: CLINIC | Age: 28
End: 2023-10-24
Attending: INTERNAL MEDICINE
Payer: COMMERCIAL

## 2023-10-24 DIAGNOSIS — E03.9 HYPOTHYROIDISM, UNSPECIFIED TYPE: ICD-10-CM

## 2023-10-24 DIAGNOSIS — R10.2 PELVIC PAIN IN FEMALE: Primary | ICD-10-CM

## 2023-10-24 DIAGNOSIS — F41.9 ANXIETY: ICD-10-CM

## 2023-10-24 DIAGNOSIS — F32.5 MAJOR DEPRESSIVE DISORDER IN FULL REMISSION, UNSPECIFIED WHETHER RECURRENT (H): ICD-10-CM

## 2023-10-24 PROCEDURE — 99214 OFFICE O/P EST MOD 30 MIN: CPT | Mod: VID | Performed by: INTERNAL MEDICINE

## 2023-10-24 ASSESSMENT — PATIENT HEALTH QUESTIONNAIRE - PHQ9
10. IF YOU CHECKED OFF ANY PROBLEMS, HOW DIFFICULT HAVE THESE PROBLEMS MADE IT FOR YOU TO DO YOUR WORK, TAKE CARE OF THINGS AT HOME, OR GET ALONG WITH OTHER PEOPLE: NOT DIFFICULT AT ALL
SUM OF ALL RESPONSES TO PHQ QUESTIONS 1-9: 1
SUM OF ALL RESPONSES TO PHQ QUESTIONS 1-9: 1

## 2023-10-24 NOTE — PROGRESS NOTES
Irene is a 27 year old who is being evaluated via a billable video visit.      How would you like to obtain your AVS? MyChart  If the video visit is dropped, the invitation should be resent by: Text to cell phone: [phone]  Will anyone else be joining your video visit? No          Assessment & Plan     (R10.2) Pelvic pain in female    Plan: US Pelvic Complete with Transvaginal.pt was told I will contact her after results and proceed accordingly.  Patient was advised to follow-up with OB/GYN       (F41.9) Anxiety  Comment: Stable  Plan: sertraline (ZOLOFT) 50 MG tablet refilled as directed.explained clearly about the medication,insructions and side effects.            (F32.5) Major depressive disorder in full remission, unspecified whether recurrent (H24)  Comment: Stable  Plan: sertraline (ZOLOFT) 50 MG tablet refilled as directed.explained clearly about the medication,insructions and side effects.     (E03.9) Hypothyroidism, unspecified type  Plan: Stable on Levoxyl 75 mcg daily      Ordering of each unique test  Prescription drug management         Giuseppe Greco MD  [facility]   Irene is a 27 year old, presenting for the following health issues:  No chief complaint on file.      10/24/2023    12:01 PM   Additional Questions   Roomed by cleveland   Accompanied by self         10/24/2023    12:01 PM   Patient Reported Additional Medications   Patient reports taking the following new medications none       History of Present Illness       Reason for visit:  Pelvic pain  Symptom onset:  More than a month  Symptoms include:  Sensitive/ sharp pain in pelvic during intercourse, tampon usage, noticed frequent urination,  Symptom intensity:  Moderate  Symptom progression:  Staying the same  Had these symptoms before:  Yes  Has tried/received treatment for these symptoms:  No    She eats 2-3 servings of fruits and vegetables daily.She consumes 1 sweetened beverage(s)  daily.She exercises with enough effort to increase her heart rate 10 to 19 minutes per day.  She exercises with enough effort to increase her heart rate 4 days per week.   She is taking medications regularly.      Depression and Anxiety Follow-Up  How are you doing with your depression since your last visit? No change  How are you doing with your anxiety since your last visit?  No change  Are you having other symptoms that might be associated with depression or anxiety? No  Have you had a significant life event? No   Do you have any concerns with your use of alcohol or other drugs? No        9/27/2022    11:48 AM 4/10/2023     5:48 PM 10/24/2023    11:35 AM   PHQ   PHQ-9 Total Score 3 1 1   Q9: Thoughts of better off dead/self-harm past 2 weeks Not at all Not at all Not at all       Hypothyroidism Follow-up    Since last visit, patient describes the following symptoms: Weight stable, no hair loss, no skin changes, no constipation, no loose stools    Pt c/o pelvic pain since 2 yrs but more since 1 month , throbbing pain, only notices with intercourse or Tampon insertion        Pt c/o frequency urination, was diagnosed with bladder infection and is on on abx for bladder inf since yesterday,     Past Medical History:   Diagnosis Date    Anxiety     Mild major depression (H24)     Thyroid disease Feb 2022    Hypothyroid       Current Outpatient Medications   Medication Sig Dispense Refill    ethynodiol-ethinyl estradiol (KELNOR 1/35) 1-35 MG-MCG tablet Take 1 tablet by mouth daily 84 tablet 3    fluticasone (FLONASE) 50 MCG/ACT nasal spray Spray 1 spray into both nostrils daily 16 g 5    gabapentin (NEURONTIN) 300 MG capsule Take 600 mg by mouth daily Through neurologist ([facility]) for restless legs and migraines      levothyroxine (SYNTHROID/LEVOTHROID) 75 MCG tablet TAKE 1 TABLET BY MOUTH EVERY DAY 90 tablet 1    sertraline (ZOLOFT) 50 MG tablet Take 1 tablet (50 mg) by mouth daily 90  tablet 1    topiramate (TOPAMAX) 50 MG tablet Take 50 mg by mouth 2 times daily Through neurologist ([facility])             Review of Systems   CONSTITUTIONAL: NEGATIVE for fever, chills, change in weight  RESP: NEGATIVE for significant cough or SOB  CV: NEGATIVE for chest pain, palpitations or peripheral edema  : Pelvic pain  ENDOCRINE: Hx thyroid disease      Objective           Vitals:  No vitals were obtained today due to virtual visit.    Physical Exam   GENERAL: Healthy, alert and no distress  EYES: Eyes grossly normal to inspection.  No discharge or erythema, or obvious scleral/conjunctival abnormalities.  RESP: No audible wheeze, cough, or visible cyanosis.  No visible retractions or increased work of breathing.    PSYCH: Mentation appears normal, affect normal/bright, judgement and insight intact, normal speech and appearance well-groomed.           Video-Visit Details    Type of service:  Video Visit      Originating Location (pt. Location): Other parked car     Distant Location (provider location):  On-site  Platform used for Video Visit: Anastacia

## 2023-11-03 ENCOUNTER — ANCILLARY PROCEDURE (OUTPATIENT)
Dept: ULTRASOUND IMAGING | Facility: CLINIC | Age: 28
End: 2023-11-03
Attending: INTERNAL MEDICINE
Payer: COMMERCIAL

## 2023-11-03 DIAGNOSIS — R10.2 PELVIC PAIN IN FEMALE: ICD-10-CM

## 2023-11-03 PROCEDURE — 76856 US EXAM PELVIC COMPLETE: CPT | Performed by: OBSTETRICS & GYNECOLOGY

## 2023-11-03 PROCEDURE — 76830 TRANSVAGINAL US NON-OB: CPT | Performed by: OBSTETRICS & GYNECOLOGY

## 2023-11-10 ENCOUNTER — VIRTUAL VISIT (OUTPATIENT)
Dept: OBGYN | Facility: CLINIC | Age: 28
End: 2023-11-10
Payer: COMMERCIAL

## 2023-11-10 DIAGNOSIS — N83.202 LEFT OVARIAN CYST: Primary | ICD-10-CM

## 2023-11-10 PROCEDURE — 99204 OFFICE O/P NEW MOD 45 MIN: CPT | Mod: 95 | Performed by: OBSTETRICS & GYNECOLOGY

## 2023-11-10 NOTE — PROGRESS NOTES
Monique Alicea is a 28 year old female who is being evaluated via a billable telephone visit.    Provider: Troy  Patient: Home    What phone number would you like to be contacted at? [phone]  How would you like to obtain your AVS? Dieter      Monique Alicea is a 28 year old female who presents for virtual visit today for the following health issue(s):  Patient presents with:  Follow Up: 11/3 left simple cyst    Farnaz Roberts CMA    Additional information:     Pelvic pain/tenderness in the last year with using tampons or with intercourse with boyfriend  Menarche age 12 - never had this happen before  Sharp pain during intercourse, starting in butt and going through back  With tampon just felt like it was never in properly even though it was, would hurt when sitting  Wondering if it's a lubrication problem    In last 2-3 weeks, had a lot of pain on left side/left abdomen/lower abdomen, started abruptly after holding urine for awhile.  Deeper in the pelvis.      Went to [facility] (works there in the [place]).  UTI testing neg.      1. Left ovarian cyst  Reviewed images personally.  Reviewed her two types of pelvic pain, one more chronic in nature which may be related to pelvic floor muscles, the second which may be related to cyst rupture/more acute event.  At this time she is stable in terms of pain, will plan to continue OCP, then repeat US in 6-8 wks with clinic in-person visit to follow, to see if the cyst resolves and to do an exam to elucidate pelvic floor pain etiology.  She has had all of her questions answered and agrees to this plan.    - US Pelvic Transabdominal and Transvaginal; Future     11 min on telephone visit  4 min on documentation    Monica Delcid MD, MPH  [facility]

## 2023-12-05 DIAGNOSIS — Z30.8 ENCOUNTER FOR OTHER CONTRACEPTIVE MANAGEMENT: ICD-10-CM

## 2023-12-06 RX ORDER — ETHYNODIOL DIACETATE AND ETHINYL ESTRADIOL TABLETS 1 MG-35MCG
1 KIT ORAL DAILY
Qty: 84 TABLET | Refills: 2 | OUTPATIENT
Start: 2023-12-06

## 2024-03-19 ENCOUNTER — PATIENT OUTREACH (OUTPATIENT)
Dept: CARE COORDINATION | Facility: CLINIC | Age: 29
End: 2024-03-19
Payer: COMMERCIAL

## 2024-04-02 ENCOUNTER — PATIENT OUTREACH (OUTPATIENT)
Dept: CARE COORDINATION | Facility: CLINIC | Age: 29
End: 2024-04-02
Payer: COMMERCIAL

## 2024-04-02 DIAGNOSIS — Z30.8 ENCOUNTER FOR OTHER CONTRACEPTIVE MANAGEMENT: ICD-10-CM

## 2024-04-02 RX ORDER — ETHYNODIOL DIACETATE AND ETHINYL ESTRADIOL 1 MG-35MCG
1 KIT ORAL DAILY
Qty: 84 TABLET | Refills: 0 | OUTPATIENT
Start: 2024-04-02

## 2024-04-02 NOTE — TELEPHONE ENCOUNTER
Pharmacy calls, Pt last refill was filled 2/28/24. Pt is out of medication. Please fill as able.     Ludmila Soriano RN [facility]

## 2024-04-03 RX ORDER — ETHYNODIOL DIACETATE AND ETHINYL ESTRADIOL 1 MG-35MCG
1 KIT ORAL DAILY
Qty: 84 TABLET | Refills: 1 | Status: SHIPPED | OUTPATIENT
Start: 2024-04-03 | End: 2024-10-03

## 2024-04-30 DIAGNOSIS — E03.9 HYPOTHYROIDISM, UNSPECIFIED TYPE: ICD-10-CM

## 2024-04-30 RX ORDER — LEVOTHYROXINE SODIUM 75 UG/1
75 TABLET ORAL DAILY
Qty: 90 TABLET | Refills: 0 | Status: SHIPPED | OUTPATIENT
Start: 2024-04-30 | End: 2024-06-26

## 2024-05-30 ENCOUNTER — TELEPHONE (OUTPATIENT)
Dept: INTERNAL MEDICINE | Facility: CLINIC | Age: 29
End: 2024-05-30
Payer: COMMERCIAL

## 2024-05-30 DIAGNOSIS — F32.5 MAJOR DEPRESSIVE DISORDER IN FULL REMISSION, UNSPECIFIED WHETHER RECURRENT (H): ICD-10-CM

## 2024-05-30 DIAGNOSIS — F41.9 ANXIETY: ICD-10-CM

## 2024-05-31 NOTE — TELEPHONE ENCOUNTER
Left voicemail for patient to call back. Please assist patient to schedule appointment per Dr Greco's message

## 2024-05-31 NOTE — TELEPHONE ENCOUNTER
Pt is due for office visit, last seen in clinic 04/23, last virtual visit 10/23. Pt is over due for physical , I have refilled for 1 month.  Please schedule on any of my same-day or approval only slot, pl advise pt

## 2024-06-01 ENCOUNTER — HEALTH MAINTENANCE LETTER (OUTPATIENT)
Age: 29
End: 2024-06-01

## 2024-06-26 ENCOUNTER — VIRTUAL VISIT (OUTPATIENT)
Dept: INTERNAL MEDICINE | Facility: CLINIC | Age: 29
End: 2024-06-26
Payer: COMMERCIAL

## 2024-06-26 DIAGNOSIS — F32.5 MAJOR DEPRESSIVE DISORDER IN FULL REMISSION, UNSPECIFIED WHETHER RECURRENT (H): ICD-10-CM

## 2024-06-26 DIAGNOSIS — F41.9 ANXIETY: ICD-10-CM

## 2024-06-26 DIAGNOSIS — E03.9 HYPOTHYROIDISM, UNSPECIFIED TYPE: ICD-10-CM

## 2024-06-26 DIAGNOSIS — E78.2 MIXED HYPERLIPIDEMIA: Primary | ICD-10-CM

## 2024-06-26 PROCEDURE — G2211 COMPLEX E/M VISIT ADD ON: HCPCS | Mod: 95 | Performed by: INTERNAL MEDICINE

## 2024-06-26 PROCEDURE — 99214 OFFICE O/P EST MOD 30 MIN: CPT | Mod: 95 | Performed by: INTERNAL MEDICINE

## 2024-06-26 PROCEDURE — 96127 BRIEF EMOTIONAL/BEHAV ASSMT: CPT | Mod: 95 | Performed by: INTERNAL MEDICINE

## 2024-06-26 RX ORDER — LEVOTHYROXINE SODIUM 75 UG/1
75 TABLET ORAL DAILY
Qty: 60 TABLET | Refills: 0 | Status: SHIPPED | OUTPATIENT
Start: 2024-06-26 | End: 2024-08-16 | Stop reason: DRUGHIGH

## 2024-06-26 ASSESSMENT — PATIENT HEALTH QUESTIONNAIRE - PHQ9: SUM OF ALL RESPONSES TO PHQ QUESTIONS 1-9: 1

## 2024-06-26 ASSESSMENT — ENCOUNTER SYMPTOMS: NERVOUS/ANXIOUS: 1

## 2024-06-26 NOTE — PROGRESS NOTES
"Irene is a 28 year old who is being evaluated via a billable video visit.    How would you like to obtain your AVS? MyChart  If the video visit is dropped, the invitation should be resent by: Text to cell phone: [phone]  Will anyone else be joining your video visit? No      Assessment & Plan        (F32.5) Major depressive disorder in full remission, unspecified whether recurrent (H24)  (F41.9) Anxiety  Plan: stable, sertraline (ZOLOFT) 50 MG tablet refilled as directed.explained clearly about the medication,insructions and side effects.             (E03.9) Hypothyroidism, unspecified type  Plan: Last TSH more than 1 year ago, check TSH with free T4 reflex, refilled levothyroxine (SYNTHROID/LEVOTHROID) 75 MCG         tablet as directed.explained clearly about the medication,insructions and side effects.          (E78.2) Mixed hyperlipidemia    Plan: Not on any medications at this time, continue to follow low-fat diet regular exercise     History of migraines follows up with a neurologist and is on Topamax and gabapentin          BMI  Estimated body mass index is 32.36 kg/m  as calculated from the following:    Height as of 8/3/23: 1.67 m (5' 5.75\").    Weight as of 8/3/23: 90.3 kg (199 lb).   Weight management plan: Discussed healthy diet and exercise guidelines        Subjective   Irene is a 28 year old, presenting for the following health issues:  Depression, Anxiety, and Thyroid Problem      6/26/2024    10:27 AM   Additional Questions   Roomed by cleveland   Accompanied by self         6/26/2024    10:27 AM   Patient Reported Additional Medications   Patient reports taking the following new medications none     Anxiety         Depression and Anxiety   How are you doing with your depression since your last visit? No change  How are you doing with your anxiety since your last visit?  No change  Are you having other symptoms that might be associated with depression or anxiety? No  Have you had a significant life " event? No   Do you have any concerns with your use of alcohol or other drugs? No        4/10/2023     5:48 PM 10/24/2023    11:35 AM 6/26/2024    10:51 AM   PHQ   PHQ-9 Total Score 1 1 1   Q9: Thoughts of better off dead/self-harm past 2 weeks Not at all Not at all Not at all          Hyperlipidemia Follow-Up    Are you regularly taking any medication or supplement to lower your cholesterol?   No  Are you having muscle aches or other side effects that you think could be caused by your cholesterol lowering medication?  NA    Hypothyroidism Follow-up    Since last visit, patient describes the following symptoms: Weight stable, no hair loss, no skin changes, no constipation, no loose stools      How many servings of fruits and vegetables do you eat daily?  0-1  On average, how many sweetened beverages do you drink each day (Examples: soda, juice, sweet tea, etc.  Do NOT count diet or artificially sweetened beverages)?   1  How many days per week do you exercise enough to make your heart beat faster? 3 or less  How many minutes a day do you exercise enough to make your heart beat faster? 20 - 29  How many days per week do you miss taking your medication? 0      Past Medical History:   Diagnosis Date    Anxiety     Mild major depression (H24)     Thyroid disease Feb 2022    Hypothyroid     Current Outpatient Medications   Medication Sig Dispense Refill    ethynodiol-ethinyl estradiol (KELNOR 1/35) 1-35 MG-MCG tablet Take 1 tablet by mouth daily 84 tablet 1    fluticasone (FLONASE) 50 MCG/ACT nasal spray Spray 1 spray into both nostrils daily 16 g 5    gabapentin (NEURONTIN) 300 MG capsule Take 600 mg by mouth daily Through neurologist ([facility]) for restless legs and migraines      levothyroxine (SYNTHROID/LEVOTHROID) 75 MCG tablet Take 1 tablet (75 mcg) by mouth daily 60 tablet 0    sertraline (ZOLOFT) 50 MG tablet Take 1 tablet (50 mg) by mouth daily 90 tablet 1    topiramate (TOPAMAX) 50 MG  tablet Take 50 mg by mouth 2 times daily Through neurologist ([facility])  1 tab morning, 2 tabs evening             Review of Systems  CONSTITUTIONAL: NEGATIVE for fever, chills,    RESP: NEGATIVE for significant cough or SOB  CV: NEGATIVE for chest pain, palpitations or peripheral edema  ENDOCRINE: NEGATIVE for temperature intolerance, skin/hair changes  PSYCHIATRIC: NEGATIVE for changes in mood or affect      Objective           Vitals:  No vitals were obtained today due to virtual visit.    Physical Exam   GENERAL: alert and no distress  EYES: Eyes grossly normal to inspection.  No discharge or erythema, or obvious scleral/conjunctival abnormalities.  RESP: No audible wheeze, cough, or visible cyanosis.    PSYCH: Appropriate affect, tone, and pace of words        Video-Visit Details    Type of service:  Video Visit   Originating Location (pt. Location): Home    Distant Location (provider location):  On-site  Platform used for Video Visit: Neha  Signed Electronically by: Giuseppe Greco MD

## 2024-06-26 NOTE — TELEPHONE ENCOUNTER
Not seen in the clinic for more than 6 months, discussed with patient and video visit scheduled today

## 2024-08-15 ENCOUNTER — OFFICE VISIT (OUTPATIENT)
Dept: INTERNAL MEDICINE | Facility: CLINIC | Age: 29
End: 2024-08-15
Attending: INTERNAL MEDICINE
Payer: COMMERCIAL

## 2024-08-15 VITALS
TEMPERATURE: 97.6 F | DIASTOLIC BLOOD PRESSURE: 79 MMHG | OXYGEN SATURATION: 99 % | HEIGHT: 66 IN | WEIGHT: 214.7 LBS | RESPIRATION RATE: 20 BRPM | SYSTOLIC BLOOD PRESSURE: 107 MMHG | BODY MASS INDEX: 34.51 KG/M2 | HEART RATE: 96 BPM

## 2024-08-15 DIAGNOSIS — F32.5 MAJOR DEPRESSIVE DISORDER IN FULL REMISSION, UNSPECIFIED WHETHER RECURRENT (H): ICD-10-CM

## 2024-08-15 DIAGNOSIS — E03.9 HYPOTHYROIDISM, UNSPECIFIED TYPE: ICD-10-CM

## 2024-08-15 DIAGNOSIS — E78.2 MIXED HYPERLIPIDEMIA: ICD-10-CM

## 2024-08-15 DIAGNOSIS — Z00.00 ROUTINE HISTORY AND PHYSICAL EXAMINATION OF ADULT: Primary | ICD-10-CM

## 2024-08-15 DIAGNOSIS — R53.83 OTHER FATIGUE: ICD-10-CM

## 2024-08-15 LAB
ALBUMIN SERPL BCG-MCNC: 4.3 G/DL (ref 3.5–5.2)
ALP SERPL-CCNC: 55 U/L (ref 40–150)
ALT SERPL W P-5'-P-CCNC: 17 U/L (ref 0–50)
ANION GAP SERPL CALCULATED.3IONS-SCNC: 9 MMOL/L (ref 7–15)
AST SERPL W P-5'-P-CCNC: 25 U/L (ref 0–45)
BILIRUB SERPL-MCNC: 0.3 MG/DL
BUN SERPL-MCNC: 12.7 MG/DL (ref 6–20)
CALCIUM SERPL-MCNC: 9 MG/DL (ref 8.8–10.4)
CHLORIDE SERPL-SCNC: 107 MMOL/L (ref 98–107)
CHOLEST SERPL-MCNC: 204 MG/DL
CREAT SERPL-MCNC: 0.88 MG/DL (ref 0.51–0.95)
EGFRCR SERPLBLD CKD-EPI 2021: >90 ML/MIN/1.73M2
ERYTHROCYTE [DISTWIDTH] IN BLOOD BY AUTOMATED COUNT: 12.2 % (ref 10–15)
FASTING STATUS PATIENT QL REPORTED: NO
FASTING STATUS PATIENT QL REPORTED: NO
GLUCOSE SERPL-MCNC: 106 MG/DL (ref 70–99)
HCO3 SERPL-SCNC: 22 MMOL/L (ref 22–29)
HCT VFR BLD AUTO: 39.6 % (ref 35–47)
HDLC SERPL-MCNC: 52 MG/DL
HGB BLD-MCNC: 13.5 G/DL (ref 11.7–15.7)
LDLC SERPL CALC-MCNC: 112 MG/DL
MCH RBC QN AUTO: 29.9 PG (ref 26.5–33)
MCHC RBC AUTO-ENTMCNC: 34.1 G/DL (ref 31.5–36.5)
MCV RBC AUTO: 88 FL (ref 78–100)
NONHDLC SERPL-MCNC: 152 MG/DL
PLATELET # BLD AUTO: 261 10E3/UL (ref 150–450)
POTASSIUM SERPL-SCNC: 3.8 MMOL/L (ref 3.4–5.3)
PROT SERPL-MCNC: 7.2 G/DL (ref 6.4–8.3)
RBC # BLD AUTO: 4.51 10E6/UL (ref 3.8–5.2)
SODIUM SERPL-SCNC: 138 MMOL/L (ref 135–145)
T4 FREE SERPL-MCNC: 1.14 NG/DL (ref 0.9–1.7)
TRIGL SERPL-MCNC: 199 MG/DL
TSH SERPL DL<=0.005 MIU/L-ACNC: 4.32 UIU/ML (ref 0.3–4.2)
VIT B12 SERPL-MCNC: 466 PG/ML (ref 232–1245)
VIT D+METAB SERPL-MCNC: 21 NG/ML (ref 20–50)
WBC # BLD AUTO: 7.3 10E3/UL (ref 4–11)

## 2024-08-15 PROCEDURE — 36415 COLL VENOUS BLD VENIPUNCTURE: CPT | Performed by: INTERNAL MEDICINE

## 2024-08-15 PROCEDURE — 80061 LIPID PANEL: CPT | Performed by: INTERNAL MEDICINE

## 2024-08-15 PROCEDURE — 84439 ASSAY OF FREE THYROXINE: CPT | Performed by: INTERNAL MEDICINE

## 2024-08-15 PROCEDURE — 84443 ASSAY THYROID STIM HORMONE: CPT | Performed by: INTERNAL MEDICINE

## 2024-08-15 PROCEDURE — 82306 VITAMIN D 25 HYDROXY: CPT | Performed by: INTERNAL MEDICINE

## 2024-08-15 PROCEDURE — 82607 VITAMIN B-12: CPT | Performed by: INTERNAL MEDICINE

## 2024-08-15 PROCEDURE — 80053 COMPREHEN METABOLIC PANEL: CPT | Performed by: INTERNAL MEDICINE

## 2024-08-15 PROCEDURE — 99395 PREV VISIT EST AGE 18-39: CPT | Performed by: INTERNAL MEDICINE

## 2024-08-15 PROCEDURE — 85027 COMPLETE CBC AUTOMATED: CPT | Performed by: INTERNAL MEDICINE

## 2024-08-15 SDOH — HEALTH STABILITY: PHYSICAL HEALTH: ON AVERAGE, HOW MANY MINUTES DO YOU ENGAGE IN EXERCISE AT THIS LEVEL?: 20 MIN

## 2024-08-15 SDOH — HEALTH STABILITY: PHYSICAL HEALTH: ON AVERAGE, HOW MANY DAYS PER WEEK DO YOU ENGAGE IN MODERATE TO STRENUOUS EXERCISE (LIKE A BRISK WALK)?: 2 DAYS

## 2024-08-15 ASSESSMENT — PATIENT HEALTH QUESTIONNAIRE - PHQ9
SUM OF ALL RESPONSES TO PHQ QUESTIONS 1-9: 3
10. IF YOU CHECKED OFF ANY PROBLEMS, HOW DIFFICULT HAVE THESE PROBLEMS MADE IT FOR YOU TO DO YOUR WORK, TAKE CARE OF THINGS AT HOME, OR GET ALONG WITH OTHER PEOPLE: VERY DIFFICULT
SUM OF ALL RESPONSES TO PHQ QUESTIONS 1-9: 3

## 2024-08-15 ASSESSMENT — SOCIAL DETERMINANTS OF HEALTH (SDOH): HOW OFTEN DO YOU GET TOGETHER WITH FRIENDS OR RELATIVES?: THREE TIMES A WEEK

## 2024-08-15 NOTE — NURSING NOTE
"/79   Pulse 96   Temp 97.6  F (36.4  C) (Tympanic)   Resp 20   Ht 1.67 m (5' 5.75\")   Wt 97.4 kg (214 lb 11.2 oz)   LMP 07/30/2024   SpO2 99%   BMI 34.92 kg/m      "

## 2024-08-15 NOTE — PROGRESS NOTES
Preventive Care Visit  [facility]  Giuseppe Greco MD, Internal Medicine  Aug 15, 2024      Assessment & Plan     (Z00.00) Routine history and physical examination of adult  (primary encounter diagnosis)  Plan: Lipid panel reflex to direct LDL Non-fasting,         Comprehensive metabolic panel, CBC with         platelets            (E78.2) Mixed hyperlipidemia  Plan: not on meds at this time, check lipid panel reflex to direct LDL Non-fasting.pt was told I will contact her after results and proceed accordingly. Continue to follow low-fat diet and regular exercise            (E03.9) Hypothyroidism, unspecified type  Plan: currently on levoxyl 75 mcg daily  , check TSH with free T4 reflex, adjust levoxyl dose after results.       (F32.5) Major depressive disorder in full remission, unspecified whether recurrent (H24)  Plan: stable     (R53.83) Other fatigue  Plan: TSH with free T4 reflex, Vitamin D Deficiency,         CBC with platelets, Vitamin B12.pt was told I will contact her after results and proceed accordingly.  If all results normal then will do sleep study             Patient has been advised of split billing requirements and indicates understanding: Yes        Counseling  Appropriate preventive services were addressed with this patient via screening, questionnaire, or discussion as appropriate for  nutrition, physical activity,  weight loss        Subjective   Irene is a 28 year old, presenting for the following:  Physical        8/15/2024    12:37 PM   Additional Questions   Roomed by cleveland   Accompanied by self         8/15/2024    12:37 PM   Patient Reported Additional Medications   Patient reports taking the following new medications none        Health Care Directive  Patient does not have a Health Care Directive or Living Will: Discussed advance care planning with patient; information given to patient to review.    HPI     Hyperlipidemia Follow-Up    Are you regularly  taking any medication or supplement to lower your cholesterol?   No  Are you having muscle aches or other side effects that you think could be caused by your cholesterol lowering medication?  NA    Depression   How are you doing with your depression since your last visit? No change  Are you having other symptoms that might be associated with depression? No  Have you had a significant life event?  No   Are you feeling anxious or having panic attacks?   No  Do you have any concerns with your use of alcohol or other drugs? No        10/24/2023    11:35 AM 6/26/2024    10:51 AM 8/15/2024    12:29 PM   PHQ   PHQ-9 Total Score 1 1 3   Q9: Thoughts of better off dead/self-harm past 2 weeks Not at all Not at all Not at all          Hypothyroidism Follow-up    Since last visit, patient describes the following symptoms: wt gain ,  no hair loss, no skin changes, no constipation, no loose stools.    Pt also c/o fatigue and feeling tired since few months, patient also complains of weight gain about 15 lbs.  Patient also admits she snores at night . Pt also states she feels like taking naps during the daytime        8/15/2024   General Health   How would you rate your overall physical health? Good   Feel stress (tense, anxious, or unable to sleep) Not at all            8/15/2024   Nutrition   Three or more servings of calcium each day? (!) NO   Diet: Low salt    Gluten-free/reduced   How many servings of fruit and vegetables per day? (!) 2-3   How many sweetened beverages each day? 0-1       Multiple values from one day are sorted in reverse-chronological order         8/15/2024   Exercise   Days per week of moderate/strenous exercise 2 days   Average minutes spent exercising at this level 20 min      (!) EXERCISE CONCERN      8/15/2024   Social Factors   Frequency of gathering with friends or relatives Three times a week   Worry food won't last until get money to buy more No   Food not last or not have enough money for food? No    Do you have housing? (Housing is defined as stable permanent housing and does not include staying ouside in a car, in a tent, in an abandoned building, in an overnight shelter, or couch-surfing.) Yes   Are you worried about losing your housing? No   Lack of transportation? No   Unable to get utilities (heat,electricity)? No            8/15/2024   Fall Risk   Fallen 2 or more times in the past year? No   Trouble with walking or balance? No             8/15/2024   Dental   Dentist two times every year? Yes            8/15/2024   TB Screening   Were you born outside of the [place]? No          Today's PHQ-9 Score:       8/15/2024    12:29 PM   PHQ-9 SCORE   PHQ-9 Total Score MyChart 3 (Minimal depression)   PHQ-9 Total Score 3         8/15/2024   Substance Use   Alcohol more than 3/day or more than 7/wk No   Do you use any other substances recreationally? No        Social History     Tobacco Use    Smoking status: Never    Smokeless tobacco: Never   Vaping Use    Vaping status: Never Used   Substance Use Topics    Alcohol use: Yes     Comment: 0-1 drinks per week    Drug use: No          Mammogram Screening - Patient under 40 years of age: Routine Mammogram Screening not recommended.           8/15/2024   One time HIV Screening   Previous HIV test? No          8/15/2024   STI Screening   New sexual partner(s) since last STI/HIV test? No        History of abnormal Pap smear: No - age 21-29 PAP every 3 years recommended        4/18/2023    11:44 AM 1/20/2020     9:04 AM   PAP / HPV   PAP Negative for Intraepithelial Lesion or Malignancy (NILM)     PAP (Historical)  NIL            8/15/2024   Contraception/Family Planning   Questions about contraception or family planning No           Reviewed and updated as needed this visit by Provider                    Past Medical History:   Diagnosis Date    Anxiety     Mild major depression (H24)     Thyroid disease Feb 2022    Hypothyroid       No past surgical history on  "file.  Current Outpatient Medications   Medication Sig Dispense Refill    ethynodiol-ethinyl estradiol (KELNOR 1/35) 1-35 MG-MCG tablet Take 1 tablet by mouth daily 84 tablet 1    fluticasone (FLONASE) 50 MCG/ACT nasal spray Spray 1 spray into both nostrils daily 16 g 5    gabapentin (NEURONTIN) 300 MG capsule Take 600 mg by mouth daily Through neurologist ([facility]) for restless legs and migraines      levothyroxine (SYNTHROID/LEVOTHROID) 75 MCG tablet Take 1 tablet (75 mcg) by mouth daily 60 tablet 0    sertraline (ZOLOFT) 50 MG tablet Take 1 tablet (50 mg) by mouth daily 90 tablet 1    topiramate (TOPAMAX) 50 MG tablet Take 50 mg by mouth 2 times daily Through neurologist ([facility])  1 tab morning, 2 tabs evening           Review of Systems  CONSTITUTIONAL: NEGATIVE for fever, chills,    EYES: NEGATIVE for vision changes or irritation  ENT/MOUTH: NEGATIVE for ear, mouth and throat problems  RESP: NEGATIVE for significant cough or SOB  BREAST: NEGATIVE for masses, tenderness or discharge  CV: NEGATIVE for chest pain, palpitations or peripheral edema  GI: NEGATIVE for nausea, abdominal pain, heartburn, or change in bowel habits  : NEGATIVE for frequency, dysuria, or hematuria  MUSCULOSKELETAL: NEGATIVE for significant arthralgias or myalgia  NEURO: NEGATIVE for weakness, dizziness or paresthesias  ENDOCRINE: NEGATIVE for temperature intolerance, skin/hair changes  HEME: NEGATIVE for bleeding problems  PSYCHIATRIC: NEGATIVE for changes in mood or affect     Objective    Exam  /79   Pulse 96   Temp 97.6  F (36.4  C) (Tympanic)   Resp 20   Ht 1.67 m (5' 5.75\")   Wt 97.4 kg (214 lb 11.2 oz)   LMP 07/30/2024   SpO2 99%   BMI 34.92 kg/m     Estimated body mass index is 34.92 kg/m  as calculated from the following:    Height as of this encounter: 1.67 m (5' 5.75\").    Weight as of this encounter: 97.4 kg (214 lb 11.2 oz).    Physical Exam  GENERAL: " alert and no distress  EYES: Eyes grossly normal to inspection, PERRL and conjunctivae and sclerae normal  HENT: ear canals and TM's normal, nose and mouth without ulcers or lesions  RESP: lungs clear to auscultation - no rales, rhonchi or wheezes  BREAST: normal without masses, tenderness or nipple discharge and no palpable axillary masses or adenopathy  CV: regular rate and rhythm, normal S1 S2,    ABDOMEN: soft, nontender, no hepatosplenomegaly, no masses and bowel sounds normal  MS: no gross musculoskeletal defects noted, no edema  NEURO: Normal strength and tone, mentation intact and speech normal  PSYCH: mentation appears normal, affect normal/bright        Signed Electronically by: Giuseppe Greco MD    Answers submitted by the patient for this visit:  Patient Health Questionnaire (Submitted on 8/15/2024)  If you checked off any problems, how difficult have these problems made it for you to do your work, take care of things at home, or get along with other people?: Very difficult  PHQ9 TOTAL SCORE: 3

## 2024-08-16 RX ORDER — LEVOTHYROXINE SODIUM 88 UG/1
88 TABLET ORAL DAILY
Qty: 90 TABLET | Refills: 0 | Status: SHIPPED | OUTPATIENT
Start: 2024-08-16

## 2024-10-03 DIAGNOSIS — Z30.8 ENCOUNTER FOR OTHER CONTRACEPTIVE MANAGEMENT: ICD-10-CM

## 2024-10-03 RX ORDER — ETHYNODIOL DIACETATE AND ETHINYL ESTRADIOL 1 MG-35MCG
1 KIT ORAL DAILY
Qty: 84 TABLET | Refills: 2 | Status: SHIPPED | OUTPATIENT
Start: 2024-10-03

## 2025-01-15 ENCOUNTER — TELEPHONE (OUTPATIENT)
Dept: INTERNAL MEDICINE | Facility: CLINIC | Age: 30
End: 2025-01-15
Payer: COMMERCIAL

## 2025-01-15 DIAGNOSIS — F41.9 ANXIETY: ICD-10-CM

## 2025-01-15 DIAGNOSIS — F32.5 MAJOR DEPRESSIVE DISORDER IN FULL REMISSION, UNSPECIFIED WHETHER RECURRENT: ICD-10-CM

## 2025-01-15 NOTE — TELEPHONE ENCOUNTER
Pt is due for depression follow up  02/25 , med refilled for now, please schedule patient for in clinic visit or virtual visit.

## 2025-01-17 NOTE — TELEPHONE ENCOUNTER
I discussed the patient's history and physical exam with the resident. I reviewed the assessment and plan and agree with the resident's documentation.      Reason for Call:  Medication or medication refill:    Do you use a [facility]?  Name of the pharmacy and phone number for the current request:  [facility] in [place]    Name of the medication requested: norgestimate-ethinyl estradiol (ORTHO-CYCLEN, SPRINTEC) 0.25-35 MG-MCG per tablet    Other request: left her meds at school. Needs a week for refill at home. Also she is breaking out with this -is there another med she should use?    Can we leave a detailed message on this number? YES    Phone number patient can be reached at: Cell number on file:    Telephone Information:       Mobile [phone]       Best Time: any    Call taken on 12/26/2018 at 12:36 PM by Paola Clark

## 2025-07-16 ENCOUNTER — PATIENT OUTREACH (OUTPATIENT)
Dept: CARE COORDINATION | Facility: CLINIC | Age: 30
End: 2025-07-16
Payer: COMMERCIAL

## 2025-07-24 ENCOUNTER — VIRTUAL VISIT (OUTPATIENT)
Dept: INTERNAL MEDICINE | Facility: CLINIC | Age: 30
End: 2025-07-24
Payer: COMMERCIAL

## 2025-07-24 DIAGNOSIS — E03.9 HYPOTHYROIDISM, UNSPECIFIED TYPE: ICD-10-CM

## 2025-07-24 DIAGNOSIS — Z30.8 ENCOUNTER FOR OTHER CONTRACEPTIVE MANAGEMENT: ICD-10-CM

## 2025-07-24 DIAGNOSIS — N83.202 CYST OF LEFT OVARY: Primary | ICD-10-CM

## 2025-07-24 DIAGNOSIS — F32.5 MAJOR DEPRESSIVE DISORDER IN FULL REMISSION, UNSPECIFIED WHETHER RECURRENT: ICD-10-CM

## 2025-07-24 DIAGNOSIS — F41.9 ANXIETY: ICD-10-CM

## 2025-07-24 RX ORDER — ETHYNODIOL DIACETATE AND ETHINYL ESTRADIOL 1 MG-35MCG
1 KIT ORAL DAILY
Qty: 84 TABLET | Refills: 0 | Status: SHIPPED | OUTPATIENT
Start: 2025-07-24

## 2025-07-24 ASSESSMENT — ANXIETY QUESTIONNAIRES
IF YOU CHECKED OFF ANY PROBLEMS ON THIS QUESTIONNAIRE, HOW DIFFICULT HAVE THESE PROBLEMS MADE IT FOR YOU TO DO YOUR WORK, TAKE CARE OF THINGS AT HOME, OR GET ALONG WITH OTHER PEOPLE: NOT DIFFICULT AT ALL
7. FEELING AFRAID AS IF SOMETHING AWFUL MIGHT HAPPEN: NOT AT ALL
1. FEELING NERVOUS, ANXIOUS, OR ON EDGE: SEVERAL DAYS
3. WORRYING TOO MUCH ABOUT DIFFERENT THINGS: SEVERAL DAYS
6. BECOMING EASILY ANNOYED OR IRRITABLE: NOT AT ALL
5. BEING SO RESTLESS THAT IT IS HARD TO SIT STILL: NOT AT ALL
2. NOT BEING ABLE TO STOP OR CONTROL WORRYING: NOT AT ALL
GAD7 TOTAL SCORE: 3
GAD7 TOTAL SCORE: 3

## 2025-07-24 ASSESSMENT — PATIENT HEALTH QUESTIONNAIRE - PHQ9
5. POOR APPETITE OR OVEREATING: SEVERAL DAYS
SUM OF ALL RESPONSES TO PHQ QUESTIONS 1-9: 0

## 2025-07-31 ENCOUNTER — TRANSFERRED RECORDS (OUTPATIENT)
Dept: HEALTH INFORMATION MANAGEMENT | Facility: CLINIC | Age: 30
End: 2025-07-31
Payer: COMMERCIAL

## 2025-08-04 ENCOUNTER — MYC MEDICAL ADVICE (OUTPATIENT)
Dept: INTERNAL MEDICINE | Facility: CLINIC | Age: 30
End: 2025-08-04
Payer: COMMERCIAL

## 2025-08-04 DIAGNOSIS — L70.0 CYSTIC ACNE: Primary | ICD-10-CM

## 2025-08-10 RX ORDER — SPIRONOLACTONE 25 MG/1
25 TABLET ORAL DAILY
Qty: 30 TABLET | Refills: 0 | Status: SHIPPED | OUTPATIENT
Start: 2025-08-10

## 2025-08-10 RX ORDER — DOXYCYCLINE HYCLATE 50 MG/1
50 CAPSULE ORAL DAILY
Qty: 30 CAPSULE | Refills: 0 | Status: SHIPPED | OUTPATIENT
Start: 2025-08-10

## 2025-08-11 ENCOUNTER — PATIENT OUTREACH (OUTPATIENT)
Dept: CARE COORDINATION | Facility: CLINIC | Age: 30
End: 2025-08-11
Payer: COMMERCIAL

## 2025-08-13 ENCOUNTER — PATIENT OUTREACH (OUTPATIENT)
Dept: CARE COORDINATION | Facility: CLINIC | Age: 30
End: 2025-08-13
Payer: COMMERCIAL